# Patient Record
Sex: FEMALE | Race: BLACK OR AFRICAN AMERICAN | NOT HISPANIC OR LATINO | URBAN - METROPOLITAN AREA
[De-identification: names, ages, dates, MRNs, and addresses within clinical notes are randomized per-mention and may not be internally consistent; named-entity substitution may affect disease eponyms.]

---

## 2017-11-30 ENCOUNTER — EMERGENCY (EMERGENCY)
Facility: HOSPITAL | Age: 47
LOS: 1 days | Discharge: HOME | End: 2017-11-30
Attending: EMERGENCY MEDICINE
Payer: COMMERCIAL

## 2017-11-30 VITALS
OXYGEN SATURATION: 100 % | TEMPERATURE: 98 F | HEART RATE: 100 BPM | WEIGHT: 128.09 LBS | RESPIRATION RATE: 16 BRPM | SYSTOLIC BLOOD PRESSURE: 206 MMHG | DIASTOLIC BLOOD PRESSURE: 106 MMHG | HEIGHT: 63 IN

## 2017-11-30 VITALS — DIASTOLIC BLOOD PRESSURE: 89 MMHG | RESPIRATION RATE: 16 BRPM | SYSTOLIC BLOOD PRESSURE: 161 MMHG | HEART RATE: 74 BPM

## 2017-11-30 DIAGNOSIS — Z90.710 ACQUIRED ABSENCE OF BOTH CERVIX AND UTERUS: Chronic | ICD-10-CM

## 2017-11-30 DIAGNOSIS — Z98.51 TUBAL LIGATION STATUS: Chronic | ICD-10-CM

## 2017-11-30 DIAGNOSIS — N89.8 OTHER SPECIFIED NONINFLAMMATORY DISORDERS OF VAGINA: Chronic | ICD-10-CM

## 2017-11-30 DIAGNOSIS — Z98.890 OTHER SPECIFIED POSTPROCEDURAL STATES: Chronic | ICD-10-CM

## 2017-11-30 LAB
ALBUMIN SERPL ELPH-MCNC: 4.5 G/DL — SIGNIFICANT CHANGE UP (ref 3.3–5.2)
ALP SERPL-CCNC: 70 U/L — SIGNIFICANT CHANGE UP (ref 40–120)
ALT FLD-CCNC: 13 U/L — SIGNIFICANT CHANGE UP
ANION GAP SERPL CALC-SCNC: 12 MMOL/L — SIGNIFICANT CHANGE UP (ref 5–17)
APTT BLD: 26 SEC — LOW (ref 27.5–37.4)
AST SERPL-CCNC: 27 U/L — SIGNIFICANT CHANGE UP
BASOPHILS # BLD AUTO: 0 K/UL — SIGNIFICANT CHANGE UP (ref 0–0.2)
BASOPHILS NFR BLD AUTO: 0.2 % — SIGNIFICANT CHANGE UP (ref 0–2)
BILIRUB SERPL-MCNC: 0.8 MG/DL — SIGNIFICANT CHANGE UP (ref 0.4–2)
BUN SERPL-MCNC: 11 MG/DL — SIGNIFICANT CHANGE UP (ref 8–20)
CALCIUM SERPL-MCNC: 9.6 MG/DL — SIGNIFICANT CHANGE UP (ref 8.6–10.2)
CHLORIDE SERPL-SCNC: 100 MMOL/L — SIGNIFICANT CHANGE UP (ref 98–107)
CO2 SERPL-SCNC: 29 MMOL/L — SIGNIFICANT CHANGE UP (ref 22–29)
CREAT SERPL-MCNC: 0.63 MG/DL — SIGNIFICANT CHANGE UP (ref 0.5–1.3)
EOSINOPHIL # BLD AUTO: 0 K/UL — SIGNIFICANT CHANGE UP (ref 0–0.5)
EOSINOPHIL NFR BLD AUTO: 0.2 % — SIGNIFICANT CHANGE UP (ref 0–6)
GLUCOSE SERPL-MCNC: 93 MG/DL — SIGNIFICANT CHANGE UP (ref 70–115)
HCT VFR BLD CALC: 39.5 % — SIGNIFICANT CHANGE UP (ref 37–47)
HGB BLD-MCNC: 13.2 G/DL — SIGNIFICANT CHANGE UP (ref 12–16)
INR BLD: 0.96 RATIO — SIGNIFICANT CHANGE UP (ref 0.88–1.16)
LYMPHOCYTES # BLD AUTO: 1.4 K/UL — SIGNIFICANT CHANGE UP (ref 1–4.8)
LYMPHOCYTES # BLD AUTO: 26 % — SIGNIFICANT CHANGE UP (ref 20–55)
MCHC RBC-ENTMCNC: 29.9 PG — SIGNIFICANT CHANGE UP (ref 27–31)
MCHC RBC-ENTMCNC: 33.4 G/DL — SIGNIFICANT CHANGE UP (ref 32–36)
MCV RBC AUTO: 89.4 FL — SIGNIFICANT CHANGE UP (ref 81–99)
MONOCYTES # BLD AUTO: 0.6 K/UL — SIGNIFICANT CHANGE UP (ref 0–0.8)
MONOCYTES NFR BLD AUTO: 12.2 % — HIGH (ref 3–10)
NEUTROPHILS # BLD AUTO: 3.2 K/UL — SIGNIFICANT CHANGE UP (ref 1.8–8)
NEUTROPHILS NFR BLD AUTO: 61.2 % — SIGNIFICANT CHANGE UP (ref 37–73)
NT-PROBNP SERPL-SCNC: 10 PG/ML — SIGNIFICANT CHANGE UP (ref 0–300)
PLATELET # BLD AUTO: 180 K/UL — SIGNIFICANT CHANGE UP (ref 150–400)
POTASSIUM SERPL-MCNC: 3.5 MMOL/L — SIGNIFICANT CHANGE UP (ref 3.5–5.3)
POTASSIUM SERPL-SCNC: 3.5 MMOL/L — SIGNIFICANT CHANGE UP (ref 3.5–5.3)
PROT SERPL-MCNC: 7.7 G/DL — SIGNIFICANT CHANGE UP (ref 6.6–8.7)
PROTHROM AB SERPL-ACNC: 10.5 SEC — SIGNIFICANT CHANGE UP (ref 9.8–12.7)
RBC # BLD: 4.42 M/UL — SIGNIFICANT CHANGE UP (ref 4.4–5.2)
RBC # FLD: 14.4 % — SIGNIFICANT CHANGE UP (ref 11–15.6)
SODIUM SERPL-SCNC: 141 MMOL/L — SIGNIFICANT CHANGE UP (ref 135–145)
TROPONIN T SERPL-MCNC: <0.01 NG/ML — SIGNIFICANT CHANGE UP (ref 0–0.06)
TROPONIN T SERPL-MCNC: <0.01 NG/ML — SIGNIFICANT CHANGE UP (ref 0–0.06)
WBC # BLD: 5.2 K/UL — SIGNIFICANT CHANGE UP (ref 4.8–10.8)
WBC # FLD AUTO: 5.2 K/UL — SIGNIFICANT CHANGE UP (ref 4.8–10.8)

## 2017-11-30 PROCEDURE — 36415 COLL VENOUS BLD VENIPUNCTURE: CPT

## 2017-11-30 PROCEDURE — 85730 THROMBOPLASTIN TIME PARTIAL: CPT

## 2017-11-30 PROCEDURE — 85610 PROTHROMBIN TIME: CPT

## 2017-11-30 PROCEDURE — 83880 ASSAY OF NATRIURETIC PEPTIDE: CPT

## 2017-11-30 PROCEDURE — 85027 COMPLETE CBC AUTOMATED: CPT

## 2017-11-30 PROCEDURE — 93010 ELECTROCARDIOGRAM REPORT: CPT

## 2017-11-30 PROCEDURE — 80053 COMPREHEN METABOLIC PANEL: CPT

## 2017-11-30 PROCEDURE — 99285 EMERGENCY DEPT VISIT HI MDM: CPT

## 2017-11-30 PROCEDURE — 96374 THER/PROPH/DIAG INJ IV PUSH: CPT

## 2017-11-30 PROCEDURE — 84484 ASSAY OF TROPONIN QUANT: CPT

## 2017-11-30 PROCEDURE — 71045 X-RAY EXAM CHEST 1 VIEW: CPT

## 2017-11-30 PROCEDURE — 93005 ELECTROCARDIOGRAM TRACING: CPT

## 2017-11-30 PROCEDURE — 71010: CPT | Mod: 26

## 2017-11-30 PROCEDURE — 99284 EMERGENCY DEPT VISIT MOD MDM: CPT | Mod: 25

## 2017-11-30 RX ORDER — METOPROLOL TARTRATE 50 MG
1 TABLET ORAL
Qty: 12 | Refills: 0
Start: 2017-11-30 | End: 2017-12-12

## 2017-11-30 RX ORDER — METOPROLOL TARTRATE 50 MG
25 TABLET ORAL DAILY
Qty: 0 | Refills: 0 | Status: DISCONTINUED | OUTPATIENT
Start: 2017-11-30 | End: 2017-12-04

## 2017-11-30 RX ORDER — FAMOTIDINE 10 MG/ML
20 INJECTION INTRAVENOUS ONCE
Qty: 0 | Refills: 0 | Status: COMPLETED | OUTPATIENT
Start: 2017-11-30 | End: 2017-11-30

## 2017-11-30 RX ORDER — LABETALOL HCL 100 MG
10 TABLET ORAL ONCE
Qty: 0 | Refills: 0 | Status: DISCONTINUED | OUTPATIENT
Start: 2017-11-30 | End: 2017-11-30

## 2017-11-30 RX ORDER — SODIUM CHLORIDE 9 MG/ML
3 INJECTION INTRAMUSCULAR; INTRAVENOUS; SUBCUTANEOUS ONCE
Qty: 0 | Refills: 0 | Status: COMPLETED | OUTPATIENT
Start: 2017-11-30 | End: 2017-11-30

## 2017-11-30 RX ADMIN — FAMOTIDINE 20 MILLIGRAM(S): 10 INJECTION INTRAVENOUS at 10:51

## 2017-11-30 RX ADMIN — SODIUM CHLORIDE 3 MILLILITER(S): 9 INJECTION INTRAMUSCULAR; INTRAVENOUS; SUBCUTANEOUS at 10:00

## 2017-11-30 RX ADMIN — Medication 30 MILLILITER(S): at 10:51

## 2017-11-30 RX ADMIN — Medication 25 MILLIGRAM(S): at 10:47

## 2017-11-30 NOTE — ED PROVIDER NOTE - PSH
S/P foot surgery  had surgery x2 to rt foot for abscess  S/P hysterectomy    S/P tubal ligation    Vaginal cyst

## 2017-11-30 NOTE — ED ADULT TRIAGE NOTE - CHIEF COMPLAINT QUOTE
has been off blood pressure medication since April and arrives today with c/o hypertension.  checked her pressure at work and it was SBP ranging from 150-170. Denies  HA, nausea, vomiting. Reports feeling. Reports chest pressure x 1 week.
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
## 2017-11-30 NOTE — ED PROVIDER NOTE - OBJECTIVE STATEMENT
46 y/o F presents with elev BP say she stopped taking her BP meds about 6 months ago - doesn't remember name of medicine, says she was at work and took her BP and found to be high so decided to come in denies specific HA some vague feeling of fullness in her chest for one week nonexertional siblings with MIs and father at young age no recent provacative testing

## 2017-11-30 NOTE — ED ADULT NURSE REASSESSMENT NOTE - NS ED NURSE REASSESS COMMENT FT1
patient found in ED, discharge instructions reviewed with patient, verbalized understanding noted, ambulatory, family member with patient

## 2017-11-30 NOTE — ED ADULT NURSE NOTE - OBJECTIVE STATEMENT
has had chest pressure for approx 1 week,has not taken bp meds since april,states she is burping and has a "gas pocket" in mid chest

## 2017-11-30 NOTE — SBIRT NOTE. - NSSBIRTSERVICES_GEN_A_ED_FT
Provided SBIRT services: Full screen positive. Brief Intervention Performed. Screening results were reviewed with the patient and patient was provided information about healthy guidelines and potential negative consequences associated with level of risk. Motivation and readiness to reduce or stop use was discussed and goals and activities to make changes were suggested/offered.  Audit Score: 19  DAST Score: 0  Duration = 20 Minutes

## 2017-12-01 NOTE — ED POST DISCHARGE NOTE - REASON FOR FOLLOW-UP
April 13, 2017      Jaret Navarro MD  [address] [address]           [facility] General Surgery  [address]  [place] [address]  Phone: [phone]  Fax: [phone]          Patient: Leia Rodriguez   MR Number: [id number]   YOB: 1951   Date of Visit: 4/12/2017       Dear Jaret Navarro:    Thank you for referring Leia Rodriguez to me for evaluation. Attached you will find relevant portions of my assessment and plan of care.    If you have questions, please do not hesitate to call me. I look forward to following Leia Rodriguez along with you.    Sincerely,    Jony Duke MD    Enclosure  CC:  No Recipients    If you would like to receive this communication electronically, please contact [email or web address] or [phone] to request more information on [facility] access.    For providers and/or their staff who would like to refer a patient to [facility], please contact us through our one-stop-shop provider referral line, [facility] , at [phone].    If you feel you have received this communication in error or would no longer like to receive these types of communications, please e-mail [email or web address]          Other follow-up call

## 2017-12-31 ENCOUNTER — EMERGENCY (EMERGENCY)
Facility: HOSPITAL | Age: 47
LOS: 1 days | Discharge: HOME | End: 2017-12-31
Attending: EMERGENCY MEDICINE | Admitting: EMERGENCY MEDICINE
Payer: COMMERCIAL

## 2017-12-31 VITALS
HEIGHT: 67 IN | SYSTOLIC BLOOD PRESSURE: 129 MMHG | TEMPERATURE: 98 F | DIASTOLIC BLOOD PRESSURE: 83 MMHG | HEART RATE: 99 BPM | WEIGHT: 119.93 LBS | OXYGEN SATURATION: 99 % | RESPIRATION RATE: 20 BRPM

## 2017-12-31 VITALS
DIASTOLIC BLOOD PRESSURE: 88 MMHG | HEART RATE: 73 BPM | SYSTOLIC BLOOD PRESSURE: 153 MMHG | RESPIRATION RATE: 18 BRPM | OXYGEN SATURATION: 100 % | TEMPERATURE: 98 F

## 2017-12-31 DIAGNOSIS — Z98.890 OTHER SPECIFIED POSTPROCEDURAL STATES: Chronic | ICD-10-CM

## 2017-12-31 DIAGNOSIS — Z98.51 TUBAL LIGATION STATUS: Chronic | ICD-10-CM

## 2017-12-31 DIAGNOSIS — R69 ILLNESS, UNSPECIFIED: ICD-10-CM

## 2017-12-31 DIAGNOSIS — F10.10 ALCOHOL ABUSE, UNCOMPLICATED: ICD-10-CM

## 2017-12-31 DIAGNOSIS — Z90.710 ACQUIRED ABSENCE OF BOTH CERVIX AND UTERUS: Chronic | ICD-10-CM

## 2017-12-31 DIAGNOSIS — N89.8 OTHER SPECIFIED NONINFLAMMATORY DISORDERS OF VAGINA: Chronic | ICD-10-CM

## 2017-12-31 DIAGNOSIS — F43.22 ADJUSTMENT DISORDER WITH ANXIETY: ICD-10-CM

## 2017-12-31 LAB
ALBUMIN SERPL ELPH-MCNC: 4.3 G/DL — SIGNIFICANT CHANGE UP (ref 3.3–5.2)
ALP SERPL-CCNC: 88 U/L — SIGNIFICANT CHANGE UP (ref 40–120)
ALT FLD-CCNC: 12 U/L — SIGNIFICANT CHANGE UP
AMPHET UR-MCNC: NEGATIVE — SIGNIFICANT CHANGE UP
ANION GAP SERPL CALC-SCNC: 13 MMOL/L — SIGNIFICANT CHANGE UP (ref 5–17)
APPEARANCE UR: CLEAR — SIGNIFICANT CHANGE UP
AST SERPL-CCNC: 24 U/L — SIGNIFICANT CHANGE UP
BACTERIA # UR AUTO: ABNORMAL
BARBITURATES UR SCN-MCNC: NEGATIVE — SIGNIFICANT CHANGE UP
BASOPHILS # BLD AUTO: 0 K/UL — SIGNIFICANT CHANGE UP (ref 0–0.2)
BASOPHILS NFR BLD AUTO: 0.3 % — SIGNIFICANT CHANGE UP (ref 0–2)
BENZODIAZ UR-MCNC: NEGATIVE — SIGNIFICANT CHANGE UP
BILIRUB SERPL-MCNC: 0.6 MG/DL — SIGNIFICANT CHANGE UP (ref 0.4–2)
BILIRUB UR-MCNC: NEGATIVE — SIGNIFICANT CHANGE UP
BUN SERPL-MCNC: 17 MG/DL — SIGNIFICANT CHANGE UP (ref 8–20)
CALCIUM SERPL-MCNC: 8.7 MG/DL — SIGNIFICANT CHANGE UP (ref 8.6–10.2)
CHLORIDE SERPL-SCNC: 102 MMOL/L — SIGNIFICANT CHANGE UP (ref 98–107)
CO2 SERPL-SCNC: 26 MMOL/L — SIGNIFICANT CHANGE UP (ref 22–29)
COCAINE METAB.OTHER UR-MCNC: NEGATIVE — SIGNIFICANT CHANGE UP
COLOR SPEC: YELLOW — SIGNIFICANT CHANGE UP
CREAT SERPL-MCNC: 0.53 MG/DL — SIGNIFICANT CHANGE UP (ref 0.5–1.3)
DIFF PNL FLD: ABNORMAL
EOSINOPHIL # BLD AUTO: 0 K/UL — SIGNIFICANT CHANGE UP (ref 0–0.5)
EOSINOPHIL NFR BLD AUTO: 0.5 % — SIGNIFICANT CHANGE UP (ref 0–6)
EPI CELLS # UR: SIGNIFICANT CHANGE UP
ETHANOL SERPL-MCNC: 177 MG/DL — SIGNIFICANT CHANGE UP
ETHANOL SERPL-MCNC: 284 MG/DL — SIGNIFICANT CHANGE UP
GLUCOSE SERPL-MCNC: 90 MG/DL — SIGNIFICANT CHANGE UP (ref 70–115)
GLUCOSE UR QL: NEGATIVE MG/DL — SIGNIFICANT CHANGE UP
HCG UR QL: NEGATIVE — SIGNIFICANT CHANGE UP
HCT VFR BLD CALC: 42.5 % — SIGNIFICANT CHANGE UP (ref 37–47)
HGB BLD-MCNC: 13.5 G/DL — SIGNIFICANT CHANGE UP (ref 12–16)
KETONES UR-MCNC: NEGATIVE — SIGNIFICANT CHANGE UP
LEUKOCYTE ESTERASE UR-ACNC: ABNORMAL
LYMPHOCYTES # BLD AUTO: 1.3 K/UL — SIGNIFICANT CHANGE UP (ref 1–4.8)
LYMPHOCYTES # BLD AUTO: 20.4 % — SIGNIFICANT CHANGE UP (ref 20–55)
MCHC RBC-ENTMCNC: 29 PG — SIGNIFICANT CHANGE UP (ref 27–31)
MCHC RBC-ENTMCNC: 31.8 G/DL — LOW (ref 32–36)
MCV RBC AUTO: 91.2 FL — SIGNIFICANT CHANGE UP (ref 81–99)
METHADONE UR-MCNC: NEGATIVE — SIGNIFICANT CHANGE UP
MONOCYTES # BLD AUTO: 0.5 K/UL — SIGNIFICANT CHANGE UP (ref 0–0.8)
MONOCYTES NFR BLD AUTO: 7.6 % — SIGNIFICANT CHANGE UP (ref 3–10)
NEUTROPHILS # BLD AUTO: 4.6 K/UL — SIGNIFICANT CHANGE UP (ref 1.8–8)
NEUTROPHILS NFR BLD AUTO: 71.2 % — SIGNIFICANT CHANGE UP (ref 37–73)
NITRITE UR-MCNC: POSITIVE
OPIATES UR-MCNC: NEGATIVE — SIGNIFICANT CHANGE UP
PCP SPEC-MCNC: SIGNIFICANT CHANGE UP
PCP UR-MCNC: NEGATIVE — SIGNIFICANT CHANGE UP
PH UR: 5 — SIGNIFICANT CHANGE UP (ref 5–8)
PLATELET # BLD AUTO: 205 K/UL — SIGNIFICANT CHANGE UP (ref 150–400)
POTASSIUM SERPL-MCNC: 3.7 MMOL/L — SIGNIFICANT CHANGE UP (ref 3.5–5.3)
POTASSIUM SERPL-SCNC: 3.7 MMOL/L — SIGNIFICANT CHANGE UP (ref 3.5–5.3)
PROT SERPL-MCNC: 7.8 G/DL — SIGNIFICANT CHANGE UP (ref 6.6–8.7)
PROT UR-MCNC: 30 MG/DL
RBC # BLD: 4.66 M/UL — SIGNIFICANT CHANGE UP (ref 4.4–5.2)
RBC # FLD: 14.9 % — SIGNIFICANT CHANGE UP (ref 11–15.6)
RBC CASTS # UR COMP ASSIST: ABNORMAL /HPF (ref 0–4)
SODIUM SERPL-SCNC: 141 MMOL/L — SIGNIFICANT CHANGE UP (ref 135–145)
SP GR SPEC: 1.02 — SIGNIFICANT CHANGE UP (ref 1.01–1.02)
THC UR QL: NEGATIVE — SIGNIFICANT CHANGE UP
TSH SERPL-MCNC: 0.15 UIU/ML — LOW (ref 0.27–4.2)
UROBILINOGEN FLD QL: NEGATIVE MG/DL — SIGNIFICANT CHANGE UP
WBC # BLD: 6.4 K/UL — SIGNIFICANT CHANGE UP (ref 4.8–10.8)
WBC # FLD AUTO: 6.4 K/UL — SIGNIFICANT CHANGE UP (ref 4.8–10.8)
WBC UR QL: SIGNIFICANT CHANGE UP

## 2017-12-31 PROCEDURE — 82962 GLUCOSE BLOOD TEST: CPT

## 2017-12-31 PROCEDURE — 87186 SC STD MICRODIL/AGAR DIL: CPT

## 2017-12-31 PROCEDURE — 84443 ASSAY THYROID STIM HORMONE: CPT

## 2017-12-31 PROCEDURE — 99284 EMERGENCY DEPT VISIT MOD MDM: CPT | Mod: 25

## 2017-12-31 PROCEDURE — 90791 PSYCH DIAGNOSTIC EVALUATION: CPT

## 2017-12-31 PROCEDURE — 85027 COMPLETE CBC AUTOMATED: CPT

## 2017-12-31 PROCEDURE — 80053 COMPREHEN METABOLIC PANEL: CPT

## 2017-12-31 PROCEDURE — 80307 DRUG TEST PRSMV CHEM ANLYZR: CPT

## 2017-12-31 PROCEDURE — 81001 URINALYSIS AUTO W/SCOPE: CPT

## 2017-12-31 PROCEDURE — 96374 THER/PROPH/DIAG INJ IV PUSH: CPT

## 2017-12-31 PROCEDURE — 87086 URINE CULTURE/COLONY COUNT: CPT

## 2017-12-31 PROCEDURE — 81025 URINE PREGNANCY TEST: CPT

## 2017-12-31 PROCEDURE — 36415 COLL VENOUS BLD VENIPUNCTURE: CPT

## 2017-12-31 RX ORDER — SODIUM CHLORIDE 9 MG/ML
999 INJECTION INTRAMUSCULAR; INTRAVENOUS; SUBCUTANEOUS ONCE
Qty: 0 | Refills: 0 | Status: COMPLETED | OUTPATIENT
Start: 2017-12-31 | End: 2017-12-31

## 2017-12-31 RX ORDER — CEFTRIAXONE 500 MG/1
1 INJECTION, POWDER, FOR SOLUTION INTRAMUSCULAR; INTRAVENOUS ONCE
Qty: 0 | Refills: 0 | Status: COMPLETED | OUTPATIENT
Start: 2017-12-31 | End: 2017-12-31

## 2017-12-31 RX ADMIN — SODIUM CHLORIDE 999 MILLILITER(S): 9 INJECTION INTRAMUSCULAR; INTRAVENOUS; SUBCUTANEOUS at 03:25

## 2017-12-31 RX ADMIN — CEFTRIAXONE 100 GRAM(S): 500 INJECTION, POWDER, FOR SOLUTION INTRAMUSCULAR; INTRAVENOUS at 03:25

## 2017-12-31 NOTE — ED BEHAVIORAL HEALTH ASSESSMENT NOTE - RISK ASSESSMENT
Low risk given no current suicidality or homicidality. Low risk given no current suicidality or homicidality. She is future-oriented; works fulltime and identifies her family as protective factors.
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
## 2017-12-31 NOTE — ED PROVIDER NOTE - NS ED ROS FT
+ETOH use, +depression   no weight change, no fever or chills  no rash, no bruises  no visual changes no eye discharge  no cough cold or congestion,   no sob, no chest pain  no orthopnea, no pnd  no abd pain, no n/v/d  no hematuria, no change in urinary habits  no joint pain, no deformity  no headache, no paresthesia

## 2017-12-31 NOTE — ED BEHAVIORAL HEALTH ASSESSMENT NOTE - SUICIDE PROTECTIVE FACTORS
Identifies reasons for living/Future oriented/Supportive social network or family/Responsibility to family and others

## 2017-12-31 NOTE — ED ADULT TRIAGE NOTE - CHIEF COMPLAINT QUOTE
pt BIBA with complaints of anxiety pt states she has extra stress lately, pt states she drink a lot today.  pt states she is looking for help.
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
## 2017-12-31 NOTE — ED BEHAVIORAL HEALTH ASSESSMENT NOTE - SUMMARY
47-year-old, , -American female, non-caregiver, mother of 5 adult children, employed, domiciles with  in [place]; without any history of psychiatric hospitalizations or treatment except in 1996 when she reportedly spent  one week at [facility] after breaking up with her ex-. With a medical history of HTN and insomnia; with a history of alcohol abuse; denies any history of drug abuse; without current legal involvement; brought in to the hospital via EMS last night after patient called 911 stating she was feeling overwhelmed.    On assessment, patient is calm and cooperative with “ok” mood; states “I was feeling overwhelmed last night after having an argument with  who is schizophrenic”. Patient went further to say that she works fulltime at [employer] from 10 PM until 7 AM and that she has to come home to clean and cook, and that her  who is not working does not help. States she feels neglected by her , and that he tends to spend the night out hanging out with his friends, and not picking up his cell phone when she calls him. Patient further states “drinking is the only way I know how to cope”, however, she adamantly denies current suicidal thoughts or current plans to end her life citing her children as protective factors. Patient reports that her 22 year old daughter who only works part time also relies on her for financial help. She feels obligated to help her as her mother.  She states that her  had an argument with her last night because he is angry about her daughter “coming into the house using her own key whenever she wants to despite not living in the house. Patient endorses her appetite as fair while her sleep is poor; however, she denies current suicidality, homicidality; denies current psychotic symptoms including hallucinations, delusions, paranoia, ideas of reference, and none noted. Patient endorses some anxiety about relapsing. However, she is declining inpatient alcohol rehab suggested to her at this time. She states she is the only provider for her household; paying all the bills, and that she cannot afford not to work at this time; however, she states she will go to AA meetings and agreed to follow-up with [facility] for outpatient psychiatric services. She has an appointment scheduled on 1/8/18 at 11 AM with the family  At this time, she does not meet criteria for inpatient psychiatry. 47-year-old, , -American female, non-caregiver, mother of 5 adult children, employed, domiciles with  in [place]; without any history of psychiatric hospitalizations or treatment except in 1996 when she reportedly spent  one week at [facility] after breaking up with her ex-. With a medical history of HTN and insomnia; with a history of alcohol abuse; denies any history of drug abuse; without current legal involvement; brought in to the hospital via EMS last night after patient called 911 stating she was feeling overwhelmed.    Patient denies current suicidal or homicidal ideations; denies current intent or plans to die; denies current psychosis, and none noted. At this time, she does not meet criteria for inpatient psychiatry. However, she is receptive to following up with outpatient psychiatry as [facility] with [facility] on 1/8/18 at 11 AM. Patient has no safety concerns about returning home with her .

## 2017-12-31 NOTE — ED PROVIDER NOTE - PHYSICAL EXAMINATION
Constitutional : Appears comfortably, talking in full sentences  Head :NC AT , no swelling  Eyes :eomi, no swelling  Mouth :mm moist, alcohol on breath, poor dentition  Neck : supple, trachea in midline  Chest :Anand air entry, symm chest expansion, no distress  Heart :S1 S2 distant  Abdomen :abd soft, non tender  Musc/Skel :ext no swelling, no deformity, no spine tenderness, distal pulses present  Neuro  :AAO 3 no focal deficits

## 2017-12-31 NOTE — ED BEHAVIORAL HEALTH ASSESSMENT NOTE - PATIENT'S CHIEF COMPLAINT
“I came to the hospital because I had very bad anxiety; I called 911 because I felt I was losing my mind; my drinking did not help”.

## 2017-12-31 NOTE — ED ADULT NURSE REASSESSMENT NOTE - NS ED NURSE REASSESS COMMENT FT1
pt placed in yellow gown. belonging placed in secure location
PIV removed, report given to , pt status unchanged, refer to flowsheet and chart, pt safety maintained, pt hemodynamically stable, pt transferred to 
pt status unchanged, refer to flowsheet and chart, pt safety maintained, pt hemodynamically stable, pending tele-psych consult
Patient received awake from main ED.  Patient alert and oriented times three.  Speech is clear and relevant.   Patient endorses she drunk "a lot" yesterday and has been feeling overwhelmed with responsibilities at home but denies suicidal or homicidal ideation.  Patient reports pattern of drinking daily after working fulltime hours at [employer].  Patient denies drinking is a problem or having withdrawal symptoms when not drinking but describes ETOH as her "friend" and reports she would like to stop.  Patient reports her  has schizophrenia and often leaves the house for periods of time and then just returns which she allows because it was his family home.  Her relationship with he  has lead to conflict with her gown children.  Patient identifies her self as main financial provider for home and caretaker of home.  Patient is not receiving any current psychiatric treatment but was once hospitalized in the 1990 after a "nervous breakdown" which she was dx with depression and started on medication but stopped shortly after starting.  Patient feels she could benefit from outpatient treatment and not in need of inpatient hospitalization.  Cooperative with security contraband assessment and securing belongings in  area.  CIWA is 0.

## 2017-12-31 NOTE — ED BEHAVIORAL HEALTH ASSESSMENT NOTE - REFERRAL / APPOINTMENT DETAILS
Appointment given for outpatient psychiatric follow-up [facility] [place] Appointment given for [facility] [place] on 1/8/18 at 11 AM.

## 2017-12-31 NOTE — ED ADULT NURSE NOTE - CAS DISCH BELONGINGS RETURNED
clothing 2 phones and a small tablet given to patient. patient assisted out to the waiting room to her spouse./Yes

## 2017-12-31 NOTE — ED BEHAVIORAL HEALTH NOTE - BEHAVIORAL HEALTH NOTE
SW note: Pt treat and release as per NP Angelina Prince.  Pt. agreed to FLS appointment.  Appointment made for Monday 1/8/18 at 11am.  Pt. reported she will contact a family member to provide transportation back to her home.  Pt. stated she does not need any other assistance from SW at this time.

## 2018-01-03 RX ORDER — CEPHALEXIN 500 MG
1 CAPSULE ORAL
Qty: 40 | Refills: 0
Start: 2018-01-03 | End: 2018-01-12

## 2018-04-05 ENCOUNTER — EMERGENCY (EMERGENCY)
Facility: HOSPITAL | Age: 48
LOS: 1 days | Discharge: HOME | End: 2018-04-05
Attending: EMERGENCY MEDICINE | Admitting: EMERGENCY MEDICINE
Payer: COMMERCIAL

## 2018-04-05 VITALS
RESPIRATION RATE: 20 BRPM | WEIGHT: 119.93 LBS | DIASTOLIC BLOOD PRESSURE: 118 MMHG | SYSTOLIC BLOOD PRESSURE: 166 MMHG | HEIGHT: 67 IN | OXYGEN SATURATION: 96 % | TEMPERATURE: 98 F | HEART RATE: 111 BPM

## 2018-04-05 VITALS
TEMPERATURE: 98 F | OXYGEN SATURATION: 98 % | DIASTOLIC BLOOD PRESSURE: 90 MMHG | SYSTOLIC BLOOD PRESSURE: 141 MMHG | HEART RATE: 90 BPM | RESPIRATION RATE: 20 BRPM

## 2018-04-05 DIAGNOSIS — Z98.890 OTHER SPECIFIED POSTPROCEDURAL STATES: Chronic | ICD-10-CM

## 2018-04-05 DIAGNOSIS — N89.8 OTHER SPECIFIED NONINFLAMMATORY DISORDERS OF VAGINA: Chronic | ICD-10-CM

## 2018-04-05 DIAGNOSIS — Z90.710 ACQUIRED ABSENCE OF BOTH CERVIX AND UTERUS: Chronic | ICD-10-CM

## 2018-04-05 DIAGNOSIS — Z98.51 TUBAL LIGATION STATUS: Chronic | ICD-10-CM

## 2018-04-05 LAB
ALBUMIN SERPL ELPH-MCNC: 3.9 G/DL — SIGNIFICANT CHANGE UP (ref 3.3–5.2)
ALP SERPL-CCNC: 74 U/L — SIGNIFICANT CHANGE UP (ref 40–120)
ALT FLD-CCNC: 12 U/L — SIGNIFICANT CHANGE UP
ANION GAP SERPL CALC-SCNC: 12 MMOL/L — SIGNIFICANT CHANGE UP (ref 5–17)
ANISOCYTOSIS BLD QL: SLIGHT — SIGNIFICANT CHANGE UP
APPEARANCE UR: ABNORMAL
AST SERPL-CCNC: 24 U/L — SIGNIFICANT CHANGE UP
BACTERIA # UR AUTO: ABNORMAL
BASOPHILS # BLD AUTO: 0 K/UL — SIGNIFICANT CHANGE UP (ref 0–0.2)
BASOPHILS NFR BLD AUTO: 0.3 % — SIGNIFICANT CHANGE UP (ref 0–2)
BILIRUB SERPL-MCNC: 1.7 MG/DL — SIGNIFICANT CHANGE UP (ref 0.4–2)
BILIRUB UR-MCNC: NEGATIVE — SIGNIFICANT CHANGE UP
BUN SERPL-MCNC: 10 MG/DL — SIGNIFICANT CHANGE UP (ref 8–20)
CALCIUM SERPL-MCNC: 9.2 MG/DL — SIGNIFICANT CHANGE UP (ref 8.6–10.2)
CHLORIDE SERPL-SCNC: 99 MMOL/L — SIGNIFICANT CHANGE UP (ref 98–107)
CO2 SERPL-SCNC: 26 MMOL/L — SIGNIFICANT CHANGE UP (ref 22–29)
COLOR SPEC: YELLOW — SIGNIFICANT CHANGE UP
CREAT SERPL-MCNC: 0.67 MG/DL — SIGNIFICANT CHANGE UP (ref 0.5–1.3)
DIFF PNL FLD: ABNORMAL
EOSINOPHIL # BLD AUTO: 0 K/UL — SIGNIFICANT CHANGE UP (ref 0–0.5)
EOSINOPHIL NFR BLD AUTO: 0.1 % — SIGNIFICANT CHANGE UP (ref 0–6)
EPI CELLS # UR: SIGNIFICANT CHANGE UP
GLUCOSE SERPL-MCNC: 101 MG/DL — SIGNIFICANT CHANGE UP (ref 70–115)
GLUCOSE UR QL: NEGATIVE MG/DL — SIGNIFICANT CHANGE UP
HCT VFR BLD CALC: 39 % — SIGNIFICANT CHANGE UP (ref 37–47)
HGB BLD-MCNC: 12.3 G/DL — SIGNIFICANT CHANGE UP (ref 12–16)
HYPOCHROMIA BLD QL: SLIGHT — SIGNIFICANT CHANGE UP
KETONES UR-MCNC: NEGATIVE — SIGNIFICANT CHANGE UP
LEUKOCYTE ESTERASE UR-ACNC: ABNORMAL
LIDOCAIN IGE QN: 22 U/L — SIGNIFICANT CHANGE UP (ref 22–51)
LYMPHOCYTES # BLD AUTO: 1.2 K/UL — SIGNIFICANT CHANGE UP (ref 1–4.8)
LYMPHOCYTES # BLD AUTO: 16.3 % — LOW (ref 20–55)
MACROCYTES BLD QL: SLIGHT — SIGNIFICANT CHANGE UP
MCHC RBC-ENTMCNC: 29.8 PG — SIGNIFICANT CHANGE UP (ref 27–31)
MCHC RBC-ENTMCNC: 31.5 G/DL — LOW (ref 32–36)
MCV RBC AUTO: 94.4 FL — SIGNIFICANT CHANGE UP (ref 81–99)
MONOCYTES # BLD AUTO: 1 K/UL — HIGH (ref 0–0.8)
MONOCYTES NFR BLD AUTO: 13.8 % — HIGH (ref 3–10)
NEUTROPHILS # BLD AUTO: 5.3 K/UL — SIGNIFICANT CHANGE UP (ref 1.8–8)
NEUTROPHILS NFR BLD AUTO: 69.2 % — SIGNIFICANT CHANGE UP (ref 37–73)
NITRITE UR-MCNC: NEGATIVE — SIGNIFICANT CHANGE UP
OVALOCYTES BLD QL SMEAR: SLIGHT — SIGNIFICANT CHANGE UP
PH UR: 7 — SIGNIFICANT CHANGE UP (ref 5–8)
PLAT MORPH BLD: NORMAL — SIGNIFICANT CHANGE UP
PLATELET # BLD AUTO: 167 K/UL — SIGNIFICANT CHANGE UP (ref 150–400)
POIKILOCYTOSIS BLD QL AUTO: SLIGHT — SIGNIFICANT CHANGE UP
POTASSIUM SERPL-MCNC: 3.6 MMOL/L — SIGNIFICANT CHANGE UP (ref 3.5–5.3)
POTASSIUM SERPL-SCNC: 3.6 MMOL/L — SIGNIFICANT CHANGE UP (ref 3.5–5.3)
PROT SERPL-MCNC: 7 G/DL — SIGNIFICANT CHANGE UP (ref 6.6–8.7)
PROT UR-MCNC: 30 MG/DL
RBC # BLD: 4.13 M/UL — LOW (ref 4.4–5.2)
RBC # FLD: 13.7 % — SIGNIFICANT CHANGE UP (ref 11–15.6)
RBC BLD AUTO: ABNORMAL
RBC CASTS # UR COMP ASSIST: ABNORMAL /HPF (ref 0–4)
SODIUM SERPL-SCNC: 137 MMOL/L — SIGNIFICANT CHANGE UP (ref 135–145)
SP GR SPEC: 1 — LOW (ref 1.01–1.02)
STOMATOCYTES BLD QL SMEAR: SLIGHT — SIGNIFICANT CHANGE UP
UROBILINOGEN FLD QL: NEGATIVE MG/DL — SIGNIFICANT CHANGE UP
WBC # BLD: 7.6 K/UL — SIGNIFICANT CHANGE UP (ref 4.8–10.8)
WBC # FLD AUTO: 7.6 K/UL — SIGNIFICANT CHANGE UP (ref 4.8–10.8)
WBC UR QL: >50

## 2018-04-05 PROCEDURE — 80053 COMPREHEN METABOLIC PANEL: CPT

## 2018-04-05 PROCEDURE — 83690 ASSAY OF LIPASE: CPT

## 2018-04-05 PROCEDURE — 87086 URINE CULTURE/COLONY COUNT: CPT

## 2018-04-05 PROCEDURE — 96374 THER/PROPH/DIAG INJ IV PUSH: CPT

## 2018-04-05 PROCEDURE — 36415 COLL VENOUS BLD VENIPUNCTURE: CPT

## 2018-04-05 PROCEDURE — 81001 URINALYSIS AUTO W/SCOPE: CPT

## 2018-04-05 PROCEDURE — 87186 SC STD MICRODIL/AGAR DIL: CPT

## 2018-04-05 PROCEDURE — 85027 COMPLETE CBC AUTOMATED: CPT

## 2018-04-05 PROCEDURE — 99284 EMERGENCY DEPT VISIT MOD MDM: CPT

## 2018-04-05 PROCEDURE — 99284 EMERGENCY DEPT VISIT MOD MDM: CPT | Mod: 25

## 2018-04-05 PROCEDURE — 74176 CT ABD & PELVIS W/O CONTRAST: CPT

## 2018-04-05 PROCEDURE — 74176 CT ABD & PELVIS W/O CONTRAST: CPT | Mod: 26

## 2018-04-05 RX ORDER — CEPHALEXIN 500 MG
1 CAPSULE ORAL
Qty: 40 | Refills: 0
Start: 2018-04-05 | End: 2018-04-14

## 2018-04-05 RX ORDER — KETOROLAC TROMETHAMINE 30 MG/ML
30 SYRINGE (ML) INJECTION ONCE
Qty: 0 | Refills: 0 | Status: DISCONTINUED | OUTPATIENT
Start: 2018-04-05 | End: 2018-04-05

## 2018-04-05 RX ORDER — CEPHALEXIN 500 MG
500 CAPSULE ORAL ONCE
Qty: 0 | Refills: 0 | Status: COMPLETED | OUTPATIENT
Start: 2018-04-05 | End: 2018-04-05

## 2018-04-05 RX ORDER — SODIUM CHLORIDE 9 MG/ML
1000 INJECTION INTRAMUSCULAR; INTRAVENOUS; SUBCUTANEOUS ONCE
Qty: 0 | Refills: 0 | Status: COMPLETED | OUTPATIENT
Start: 2018-04-05 | End: 2018-04-05

## 2018-04-05 RX ADMIN — Medication 500 MILLIGRAM(S): at 22:51

## 2018-04-05 RX ADMIN — Medication 30 MILLIGRAM(S): at 20:57

## 2018-04-05 RX ADMIN — SODIUM CHLORIDE 1000 MILLILITER(S): 9 INJECTION INTRAMUSCULAR; INTRAVENOUS; SUBCUTANEOUS at 20:57

## 2018-04-05 NOTE — ED PROVIDER NOTE - CHPI ED SYMPTOMS NEG
no dysuria/no fever/no hematuria/no chills/no diarrhea/no abdominal distension/no blood in stool/no vomiting

## 2018-04-05 NOTE — ED ADULT NURSE NOTE - OBJECTIVE STATEMENT
received pt AOx4 in [facility], c/o lower back pain radiating to RLQ pain started on Tuesday, hx of back pain, states heavy lifting at work, dysuria, fevers chills, sweats,  resp even unlabored, in no distress, MAEx4, neuro intact. will continue to monitor

## 2018-04-05 NOTE — ED PROVIDER NOTE - OBJECTIVE STATEMENT
48 yo F PT, PmHx: HTN, Pshx: hysterectomy, oophorectomy, presents to ED complaining of right flank pain x 2 days. PT states she is having sharp right flank pain radiating towards her groin, with associated burning on urination and nausea. PT states pain started and " It thought it was muscles but today it became 10/10 pain wrapping towards my groin. PT denies hematuria, vaginal discharge, fevers, chills, V/D, constipation, chest pain, SOB, trauma, and any other associated symptoms.

## 2018-04-05 NOTE — ED PROVIDER NOTE - PROGRESS NOTE DETAILS
PT hungry and requesting to eat. Pt states pain decreased. labs and imaging reviewed with PT. rx abx. PT verbalized understanding of diagnosis and importance of follow up at [facility]. PT educated on importance of follow up and when to return to the ED.

## 2018-04-05 NOTE — ED PROVIDER NOTE - ATTENDING CONTRIBUTION TO CARE
46 yo Female seen and evaluated with ACP  Presents to ED for right flank pain x 2 days  PSH significant for hysterectomy and oopherectomy  urinary symptoms present = dysuria  vitals reviewed, exam as documented, right CVAT  suspicious for UTI/Pyelo  CT to eval for renal stone/infected stone  treat symptomatically, check results, reassess

## 2018-04-09 NOTE — ED ADULT TRIAGE NOTE - PAIN: PRESENCE, MLM
Muscle Hinge Flap Text: The defect edges were debeveled with a #15 scalpel blade.  Given the size, depth and location of the defect and the proximity to free margins a muscle hinge flap was deemed most appropriate.  Using a sterile surgical marker, an appropriate hinge flap was drawn incorporating the defect. The area thus outlined was incised with a #15 scalpel blade.  The skin margins were undermined to an appropriate distance in all directions utilizing iris scissors. denies pain/discomfort

## 2018-07-08 ENCOUNTER — EMERGENCY (EMERGENCY)
Facility: HOSPITAL | Age: 48
LOS: 1 days | Discharge: HOME | End: 2018-07-08
Attending: EMERGENCY MEDICINE
Payer: COMMERCIAL

## 2018-07-08 VITALS
TEMPERATURE: 98 F | SYSTOLIC BLOOD PRESSURE: 104 MMHG | HEART RATE: 160 BPM | HEIGHT: 67 IN | RESPIRATION RATE: 16 BRPM | OXYGEN SATURATION: 99 % | DIASTOLIC BLOOD PRESSURE: 72 MMHG | WEIGHT: 119.93 LBS

## 2018-07-08 DIAGNOSIS — Z98.51 TUBAL LIGATION STATUS: Chronic | ICD-10-CM

## 2018-07-08 DIAGNOSIS — N89.8 OTHER SPECIFIED NONINFLAMMATORY DISORDERS OF VAGINA: Chronic | ICD-10-CM

## 2018-07-08 DIAGNOSIS — Z98.890 OTHER SPECIFIED POSTPROCEDURAL STATES: Chronic | ICD-10-CM

## 2018-07-08 DIAGNOSIS — Z90.710 ACQUIRED ABSENCE OF BOTH CERVIX AND UTERUS: Chronic | ICD-10-CM

## 2018-07-08 LAB
ALBUMIN SERPL ELPH-MCNC: 4.3 G/DL — SIGNIFICANT CHANGE UP (ref 3.3–5.2)
ALP SERPL-CCNC: 81 U/L — SIGNIFICANT CHANGE UP (ref 40–120)
ALT FLD-CCNC: 26 U/L — SIGNIFICANT CHANGE UP
AMPHET UR-MCNC: NEGATIVE — SIGNIFICANT CHANGE UP
ANION GAP SERPL CALC-SCNC: 23 MMOL/L — HIGH (ref 5–17)
APPEARANCE UR: CLEAR — SIGNIFICANT CHANGE UP
APTT BLD: 25.3 SEC — LOW (ref 27.5–37.4)
AST SERPL-CCNC: 48 U/L — HIGH
BARBITURATES UR SCN-MCNC: NEGATIVE — SIGNIFICANT CHANGE UP
BASOPHILS # BLD AUTO: 0 K/UL — SIGNIFICANT CHANGE UP (ref 0–0.2)
BASOPHILS NFR BLD AUTO: 0.3 % — SIGNIFICANT CHANGE UP (ref 0–2)
BENZODIAZ UR-MCNC: NEGATIVE — SIGNIFICANT CHANGE UP
BILIRUB SERPL-MCNC: 0.7 MG/DL — SIGNIFICANT CHANGE UP (ref 0.4–2)
BILIRUB UR-MCNC: NEGATIVE — SIGNIFICANT CHANGE UP
BUN SERPL-MCNC: 13 MG/DL — SIGNIFICANT CHANGE UP (ref 8–20)
CALCIUM SERPL-MCNC: 9.7 MG/DL — SIGNIFICANT CHANGE UP (ref 8.6–10.2)
CHLORIDE SERPL-SCNC: 100 MMOL/L — SIGNIFICANT CHANGE UP (ref 98–107)
CO2 SERPL-SCNC: 22 MMOL/L — SIGNIFICANT CHANGE UP (ref 22–29)
COCAINE METAB.OTHER UR-MCNC: NEGATIVE — SIGNIFICANT CHANGE UP
COLOR SPEC: YELLOW — SIGNIFICANT CHANGE UP
CREAT SERPL-MCNC: 0.7 MG/DL — SIGNIFICANT CHANGE UP (ref 0.5–1.3)
D DIMER BLD IA.RAPID-MCNC: 497 NG/ML DDU — HIGH
DIFF PNL FLD: ABNORMAL
EOSINOPHIL # BLD AUTO: 0 K/UL — SIGNIFICANT CHANGE UP (ref 0–0.5)
EOSINOPHIL NFR BLD AUTO: 0.3 % — SIGNIFICANT CHANGE UP (ref 0–6)
EPI CELLS # UR: SIGNIFICANT CHANGE UP
ETHANOL SERPL-MCNC: 302 MG/DL — SIGNIFICANT CHANGE UP
GLUCOSE SERPL-MCNC: 90 MG/DL — SIGNIFICANT CHANGE UP (ref 70–115)
GLUCOSE UR QL: NEGATIVE MG/DL — SIGNIFICANT CHANGE UP
HCG SERPL-ACNC: <5 MIU/ML — SIGNIFICANT CHANGE UP
HCT VFR BLD CALC: 42.1 % — SIGNIFICANT CHANGE UP (ref 37–47)
HGB BLD-MCNC: 14.1 G/DL — SIGNIFICANT CHANGE UP (ref 12–16)
INR BLD: 0.96 RATIO — SIGNIFICANT CHANGE UP (ref 0.88–1.16)
KETONES UR-MCNC: NEGATIVE — SIGNIFICANT CHANGE UP
LEUKOCYTE ESTERASE UR-ACNC: NEGATIVE — SIGNIFICANT CHANGE UP
LYMPHOCYTES # BLD AUTO: 3.9 K/UL — SIGNIFICANT CHANGE UP (ref 1–4.8)
LYMPHOCYTES # BLD AUTO: 51.1 % — SIGNIFICANT CHANGE UP (ref 20–55)
MCHC RBC-ENTMCNC: 29.9 PG — SIGNIFICANT CHANGE UP (ref 27–31)
MCHC RBC-ENTMCNC: 33.5 G/DL — SIGNIFICANT CHANGE UP (ref 32–36)
MCV RBC AUTO: 89.2 FL — SIGNIFICANT CHANGE UP (ref 81–99)
METHADONE UR-MCNC: NEGATIVE — SIGNIFICANT CHANGE UP
MONOCYTES # BLD AUTO: 0.5 K/UL — SIGNIFICANT CHANGE UP (ref 0–0.8)
MONOCYTES NFR BLD AUTO: 6.6 % — SIGNIFICANT CHANGE UP (ref 3–10)
NEUTROPHILS # BLD AUTO: 3.1 K/UL — SIGNIFICANT CHANGE UP (ref 1.8–8)
NEUTROPHILS NFR BLD AUTO: 41.2 % — SIGNIFICANT CHANGE UP (ref 37–73)
NITRITE UR-MCNC: NEGATIVE — SIGNIFICANT CHANGE UP
OPIATES UR-MCNC: NEGATIVE — SIGNIFICANT CHANGE UP
PCP SPEC-MCNC: SIGNIFICANT CHANGE UP
PCP UR-MCNC: NEGATIVE — SIGNIFICANT CHANGE UP
PH UR: 6 — SIGNIFICANT CHANGE UP (ref 5–8)
PLATELET # BLD AUTO: 248 K/UL — SIGNIFICANT CHANGE UP (ref 150–400)
POTASSIUM SERPL-MCNC: 3.5 MMOL/L — SIGNIFICANT CHANGE UP (ref 3.5–5.3)
POTASSIUM SERPL-SCNC: 3.5 MMOL/L — SIGNIFICANT CHANGE UP (ref 3.5–5.3)
PROT SERPL-MCNC: 8 G/DL — SIGNIFICANT CHANGE UP (ref 6.6–8.7)
PROT UR-MCNC: 30 MG/DL
PROTHROM AB SERPL-ACNC: 10.6 SEC — SIGNIFICANT CHANGE UP (ref 9.8–12.7)
RBC # BLD: 4.72 M/UL — SIGNIFICANT CHANGE UP (ref 4.4–5.2)
RBC # FLD: 13.6 % — SIGNIFICANT CHANGE UP (ref 11–15.6)
RBC CASTS # UR COMP ASSIST: ABNORMAL /HPF (ref 0–4)
SODIUM SERPL-SCNC: 145 MMOL/L — SIGNIFICANT CHANGE UP (ref 135–145)
SP GR SPEC: 1.01 — SIGNIFICANT CHANGE UP (ref 1.01–1.02)
THC UR QL: NEGATIVE — SIGNIFICANT CHANGE UP
TSH SERPL-MCNC: 0.45 UIU/ML — SIGNIFICANT CHANGE UP (ref 0.27–4.2)
UROBILINOGEN FLD QL: 1 MG/DL
WBC # BLD: 7.6 K/UL — SIGNIFICANT CHANGE UP (ref 4.8–10.8)
WBC # FLD AUTO: 7.6 K/UL — SIGNIFICANT CHANGE UP (ref 4.8–10.8)
WBC UR QL: SIGNIFICANT CHANGE UP

## 2018-07-08 PROCEDURE — 90792 PSYCH DIAG EVAL W/MED SRVCS: CPT | Mod: GT

## 2018-07-08 PROCEDURE — 71275 CT ANGIOGRAPHY CHEST: CPT | Mod: 26

## 2018-07-08 PROCEDURE — 71045 X-RAY EXAM CHEST 1 VIEW: CPT | Mod: 26

## 2018-07-08 PROCEDURE — 93010 ELECTROCARDIOGRAM REPORT: CPT

## 2018-07-08 PROCEDURE — 99285 EMERGENCY DEPT VISIT HI MDM: CPT

## 2018-07-08 RX ORDER — SODIUM CHLORIDE 9 MG/ML
1000 INJECTION INTRAMUSCULAR; INTRAVENOUS; SUBCUTANEOUS ONCE
Qty: 0 | Refills: 0 | Status: COMPLETED | OUTPATIENT
Start: 2018-07-08 | End: 2018-07-08

## 2018-07-08 RX ADMIN — SODIUM CHLORIDE 1000 MILLILITER(S): 9 INJECTION INTRAMUSCULAR; INTRAVENOUS; SUBCUTANEOUS at 17:30

## 2018-07-08 NOTE — ED PROVIDER NOTE - CARE PLAN
Principal Discharge DX:	Adjustment disorder with mixed anxiety and depressed mood  Secondary Diagnosis:	Alcohol abuse

## 2018-07-08 NOTE — ED ADULT TRIAGE NOTE - CHIEF COMPLAINT QUOTE
pt aretha amaro stating she is stressed and overwhlemed, been drinking all day, , md nicholas at bedside pt aretha amaro stating she is stressed and overwhlemed, been drinking all day, , md nicholas at bedside, no HI or Si

## 2018-07-08 NOTE — ED ADULT NURSE NOTE - CHIEF COMPLAINT QUOTE
pt aretha amaro stating she is stressed and overwhlemed, been drinking all day, , md nicholas at bedside, no HI or Si

## 2018-07-08 NOTE — ED ADULT NURSE REASSESSMENT NOTE - NS ED NURSE REASSESS COMMENT FT1
assumed care of patient. pt alert and oriented x4. VSS, afebrile. Pt resting in bed comfortably. pt in yellow gown.  normal sinus on monitor. pt denies pain. will continue to monitor

## 2018-07-08 NOTE — ED PROVIDER NOTE - OBJECTIVE STATEMENT
48 y/o F pt with hx of HTN presents to ED BIBA c/o anxiety, and depression. 46 y/o F pt with hx of HTN presents to ED BIBA c/o anxiety, and depression. Pt reports that she's been drinking today, and every other day as well, more than usual today because she's been getting into arguments with her . She says that whenever her and her  get int arguments she tends to drink a lot to cover up her issues with him. She states today she got tired of all the fighting, and got overwhelmed so she came into the ED to face her issues. She states that she feels depressed and anxious all the time, but she doesn't feel like harming herself or anyone else, she says "I just feel tired". She admits that about 5-6 years ago she had a lot of chest pain but was never hospitalized or saw a cardiologist for it. Pt reports that her family has a hx of high blood pressure, and that her father passed away from it, and most of her sisters have HTN. She is currently on HTN medicine. She is a current day smoker, smokes at least 4-5 cigarettes a day. Denies CP, SOB, dizziness, palpitations, leg pain, edema, diarrhea, vomiting, any recent travel, hx of blood clots, and any drug use. No further complaints at this time.   PCP: Annabel

## 2018-07-08 NOTE — ED PROVIDER NOTE - PROGRESS NOTE DETAILS
Davis: received sign out, patient pending eval for SW and SBIRT; has safe place to go, eager for outpt resources; cleared by psych for d/c; OK for d/c

## 2018-07-08 NOTE — ED PROVIDER NOTE - CARDIAC, MLM
Normal rate, regular rhythm.  Heart sounds S1, S2.  No murmurs, rubs or gallops. Tachycardic.  Heart sounds S1, S2.  No murmurs, rubs or gallops.

## 2018-07-09 VITALS
OXYGEN SATURATION: 99 % | HEART RATE: 80 BPM | DIASTOLIC BLOOD PRESSURE: 70 MMHG | SYSTOLIC BLOOD PRESSURE: 150 MMHG | RESPIRATION RATE: 20 BRPM

## 2018-07-09 DIAGNOSIS — F10.10 ALCOHOL ABUSE, UNCOMPLICATED: ICD-10-CM

## 2018-07-09 DIAGNOSIS — F43.23 ADJUSTMENT DISORDER WITH MIXED ANXIETY AND DEPRESSED MOOD: ICD-10-CM

## 2018-07-09 LAB — ETHANOL SERPL-MCNC: <10 MG/DL — SIGNIFICANT CHANGE UP

## 2018-07-09 PROCEDURE — 36415 COLL VENOUS BLD VENIPUNCTURE: CPT

## 2018-07-09 PROCEDURE — 71045 X-RAY EXAM CHEST 1 VIEW: CPT

## 2018-07-09 PROCEDURE — 81001 URINALYSIS AUTO W/SCOPE: CPT

## 2018-07-09 PROCEDURE — 85610 PROTHROMBIN TIME: CPT

## 2018-07-09 PROCEDURE — 80307 DRUG TEST PRSMV CHEM ANLYZR: CPT

## 2018-07-09 PROCEDURE — 85379 FIBRIN DEGRADATION QUANT: CPT

## 2018-07-09 PROCEDURE — 84443 ASSAY THYROID STIM HORMONE: CPT

## 2018-07-09 PROCEDURE — 84702 CHORIONIC GONADOTROPIN TEST: CPT

## 2018-07-09 PROCEDURE — 85730 THROMBOPLASTIN TIME PARTIAL: CPT

## 2018-07-09 PROCEDURE — 85027 COMPLETE CBC AUTOMATED: CPT

## 2018-07-09 PROCEDURE — 99285 EMERGENCY DEPT VISIT HI MDM: CPT | Mod: 25

## 2018-07-09 PROCEDURE — 71275 CT ANGIOGRAPHY CHEST: CPT

## 2018-07-09 PROCEDURE — 93005 ELECTROCARDIOGRAM TRACING: CPT

## 2018-07-09 PROCEDURE — 80053 COMPREHEN METABOLIC PANEL: CPT

## 2018-07-09 NOTE — ED ADULT NURSE REASSESSMENT NOTE - NS ED NURSE REASSESS COMMENT FT1
received call from telepsych requesting pt be moved to private area with 1:1 for evaluation. telepsych informed RN currently in middle of pt care and need additional time to prepare pt for eval. as per telepsych, "were busy so we will move on and come back."

## 2018-07-09 NOTE — ED BEHAVIORAL HEALTH ASSESSMENT NOTE - REFERRAL / APPOINTMENT DETAILS
Referrals provided to patient for local outpatient psych clinics and substance abuse treatment centers
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
## 2018-07-09 NOTE — ED ADULT NURSE REASSESSMENT NOTE - NS ED NURSE REASSESS COMMENT FT1
pt resting in bed comfortably with eyes closed. pt denies pain, in no distress, voices no complaints. will continue to monitor

## 2018-07-09 NOTE — ED ADULT NURSE REASSESSMENT NOTE - NS ED NURSE REASSESS COMMENT FT1
assumed pt care this am, BAL level now normal. pt is alert, await SW and SBIRT team for assistance. wait final dispo from MD.

## 2018-07-09 NOTE — ED BEHAVIORAL HEALTH ASSESSMENT NOTE - SAFETY PLAN DETAILS
Return to ED or call 911 with worsening symptoms, ETOH w/d, SI/HI, or any other imminent safety concerns.

## 2018-07-09 NOTE — ED ADULT NURSE REASSESSMENT NOTE - NS ED NURSE REASSESS COMMENT FT1
pt a+ox3, sitting up comfortably in bed eating breakfast. pt denies any pain or discomfort. pt in no apparent distress. per MD, rpt BAL drawn and sent to lab, awaiting results.
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
## 2018-07-09 NOTE — ED BEHAVIORAL HEALTH ASSESSMENT NOTE - ACCESS TO FIREARM
"Chief Complaint   Patient presents with     Hypertension     COPD     Panel Management     MyChart, FIT, Tobacco Cessation       Initial BP (!) 170/110  Pulse 74  Temp 98.7  F (37.1  C) (Temporal)  Resp 10  Ht 5' 9.69\" (1.77 m)  Wt 207 lb 3.2 oz (94 kg)  SpO2 97%  BMI 30 kg/m2 Estimated body mass index is 30 kg/(m^2) as calculated from the following:    Height as of this encounter: 5' 9.69\" (1.77 m).    Weight as of this encounter: 207 lb 3.2 oz (94 kg).  Medication Reconciliation: complete     Heydi Arellano CMA  "
No

## 2018-07-09 NOTE — ED ADULT NURSE REASSESSMENT NOTE - NS ED NURSE REASSESS COMMENT FT1
pt ambulating with steady gait to bathroom, in no apparent distress or discomfort. awaiting tele psych eval, will continue to monitor.

## 2018-07-09 NOTE — ED BEHAVIORAL HEALTH ASSESSMENT NOTE - DIFFERENTIAL
Adjustment disorder, with anxious and depressed mood, substance induced mood and anxiety disorder  Alcohol abuse
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
## 2018-07-09 NOTE — ED BEHAVIORAL HEALTH ASSESSMENT NOTE - DESCRIPTION (FIRST USE, LAST USE, QUANTITY, FREQUENCY, DURATION)
smokes daily age 18 was first use. States that for the last several weeks she has been drinking "almost daily" 4-5 drinks/day. Recent bringe drinking per HPI

## 2018-07-09 NOTE — ED BEHAVIORAL HEALTH ASSESSMENT NOTE - RISK ASSESSMENT
Risk factors include alcohol abuse, not currently in psych treatment, prior suicide attempts, prior inpt hospitalization, and acute marital stressors. Protective factors include intact social support from friends, no current SI/HI, engaged in work, has children, no legal problems, no psychosis, help seeking, and able to engage in safety planning. At this time pt is low imminent risk of harm but at elevated chronic risk. Plan to mitigate risk by providing outpatient psychiatry and substance abuse treatment referrals.

## 2018-07-09 NOTE — ED BEHAVIORAL HEALTH ASSESSMENT NOTE - DESCRIPTION
HTN- pt states she is taking a pill for this but does not know the name. States she is only intermittently compliant. NKDA. See HPI Calm, cooperative, and pleasant.    Vital Signs Last 24 Hrs  T(C): 36.9 (09 Jul 2018 02:25), Max: 36.9 (09 Jul 2018 02:25)  T(F): 98.5 (09 Jul 2018 02:25), Max: 98.5 (09 Jul 2018 02:25)  HR: 77 (09 Jul 2018 06:19) (77 - 160)  BP: 166/87 (09 Jul 2018 06:19) (104/72 - 166/87)  BP(mean): --  RR: 18 (09 Jul 2018 06:19) (16 - 20)  SpO2: 99% (09 Jul 2018 06:19) (97% - 99%)

## 2018-07-09 NOTE — ED BEHAVIORAL HEALTH ASSESSMENT NOTE - SUMMARY
Patient is a 47-year-old, , -American female, with reported PPhx of anxiety and depression, 2 prior inpt hospitalizations (one in 1995 s/o suicide attempt by overdose and one in Dec 2017 for depression), 1 prior suicide attempt, denies hx of violence/ legal problems, significant hx of alcohol abuse, denies w/d symptoms or DT/seizures, and Pmhx of HTN and insomnia. Patient self presents today for increased anxiety and "feeling overwhelmed" in the context of acute marital stressors and 2 days of binge drinking. BAL on arrival was >300. Upon achieving sobriety, pt states she is feeling "much calmer" and requests to go home. She denies symptoms of any major mood episodes, denies psychotic symptoms, and denies SI/HI. She reports motivation to achieve sobriety and is accepting of substance treatment referrals. At this time pt is at low imminent risk of harm and involuntary inpt hospitalization is not warranted.

## 2018-07-14 ENCOUNTER — EMERGENCY (EMERGENCY)
Facility: HOSPITAL | Age: 48
LOS: 1 days | Discharge: HOME | End: 2018-07-14
Attending: EMERGENCY MEDICINE
Payer: COMMERCIAL

## 2018-07-14 VITALS
TEMPERATURE: 98 F | WEIGHT: 100.09 LBS | HEART RATE: 80 BPM | OXYGEN SATURATION: 98 % | HEIGHT: 67 IN | RESPIRATION RATE: 18 BRPM | SYSTOLIC BLOOD PRESSURE: 136 MMHG | DIASTOLIC BLOOD PRESSURE: 86 MMHG

## 2018-07-14 DIAGNOSIS — Z90.710 ACQUIRED ABSENCE OF BOTH CERVIX AND UTERUS: Chronic | ICD-10-CM

## 2018-07-14 DIAGNOSIS — Z98.51 TUBAL LIGATION STATUS: Chronic | ICD-10-CM

## 2018-07-14 DIAGNOSIS — Z98.890 OTHER SPECIFIED POSTPROCEDURAL STATES: Chronic | ICD-10-CM

## 2018-07-14 DIAGNOSIS — N89.8 OTHER SPECIFIED NONINFLAMMATORY DISORDERS OF VAGINA: Chronic | ICD-10-CM

## 2018-07-14 PROCEDURE — 99284 EMERGENCY DEPT VISIT MOD MDM: CPT

## 2018-07-14 PROCEDURE — 99285 EMERGENCY DEPT VISIT HI MDM: CPT
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
## 2018-07-14 NOTE — ED PROVIDER NOTE - PROGRESS NOTE DETAILS
patient without SI or HI; has been seen by ASHLEY and Price in past without acute psychiatric complaints currently; resources provided to patient for detox and rehab; patient walked herself into ED, she is fully ambulatory, dressing self, speaking in clear and complete sentences; she is safe for d/c

## 2018-07-14 NOTE — ED ADULT NURSE REASSESSMENT NOTE - NS ED NURSE REASSESS COMMENT FT1
pt s/p consult with  ,provided multiuple resources for outpatient facilities. pt given bus tickets and is cleared to be discharged home. pt states she feels better

## 2018-07-14 NOTE — CHART NOTE - NSCHARTNOTEFT_GEN_A_CORE
SW Note - met with pt and pt expresses a desire to be sober but an unwillingness to go to detox or rehab. pt given outpatient and inpatient resources and the 24/7 hotline. pt states she will follow up when she is feeling better. Bus tickets given for transport home.

## 2018-07-14 NOTE — ED ADULT TRIAGE NOTE - CHIEF COMPLAINT QUOTE
Patient BIBA, ambulatory, states she has been drinking since yesterday, looking for help with her alcohol abuse and depression, states she is not suicidal or homicidal

## 2018-07-14 NOTE — ED ADULT NURSE NOTE - OBJECTIVE STATEMENT
pt states she drank today unknown how much. pt says she drinks because she feels sad . pt is sad because of marital issues.

## 2018-12-13 ENCOUNTER — EMERGENCY (EMERGENCY)
Facility: HOSPITAL | Age: 48
LOS: 1 days | Discharge: HOME | End: 2018-12-13
Attending: STUDENT IN AN ORGANIZED HEALTH CARE EDUCATION/TRAINING PROGRAM
Payer: COMMERCIAL

## 2018-12-13 VITALS
HEIGHT: 66 IN | OXYGEN SATURATION: 99 % | RESPIRATION RATE: 18 BRPM | WEIGHT: 110.01 LBS | SYSTOLIC BLOOD PRESSURE: 150 MMHG | HEART RATE: 118 BPM | DIASTOLIC BLOOD PRESSURE: 92 MMHG | TEMPERATURE: 99 F

## 2018-12-13 DIAGNOSIS — Z98.51 TUBAL LIGATION STATUS: Chronic | ICD-10-CM

## 2018-12-13 DIAGNOSIS — Z90.710 ACQUIRED ABSENCE OF BOTH CERVIX AND UTERUS: Chronic | ICD-10-CM

## 2018-12-13 DIAGNOSIS — N89.8 OTHER SPECIFIED NONINFLAMMATORY DISORDERS OF VAGINA: Chronic | ICD-10-CM

## 2018-12-13 DIAGNOSIS — Z98.890 OTHER SPECIFIED POSTPROCEDURAL STATES: Chronic | ICD-10-CM

## 2018-12-13 PROBLEM — I10 ESSENTIAL (PRIMARY) HYPERTENSION: Chronic | Status: ACTIVE | Noted: 2017-11-30

## 2018-12-13 PROBLEM — F32.9 MAJOR DEPRESSIVE DISORDER, SINGLE EPISODE, UNSPECIFIED: Chronic | Status: ACTIVE | Noted: 2018-07-14

## 2018-12-13 LAB
ALBUMIN SERPL ELPH-MCNC: 4.3 G/DL — SIGNIFICANT CHANGE UP (ref 3.3–5.2)
ALP SERPL-CCNC: 76 U/L — SIGNIFICANT CHANGE UP (ref 40–120)
ALT FLD-CCNC: 14 U/L — SIGNIFICANT CHANGE UP
AMPHET UR-MCNC: NEGATIVE — SIGNIFICANT CHANGE UP
ANION GAP SERPL CALC-SCNC: 15 MMOL/L — SIGNIFICANT CHANGE UP (ref 5–17)
APAP SERPL-MCNC: <7.5 UG/ML — LOW (ref 10–26)
APPEARANCE UR: CLEAR — SIGNIFICANT CHANGE UP
AST SERPL-CCNC: 22 U/L — SIGNIFICANT CHANGE UP
BACTERIA # UR AUTO: ABNORMAL
BARBITURATES UR SCN-MCNC: NEGATIVE — SIGNIFICANT CHANGE UP
BASOPHILS # BLD AUTO: 0 K/UL — SIGNIFICANT CHANGE UP (ref 0–0.2)
BASOPHILS NFR BLD AUTO: 0.2 % — SIGNIFICANT CHANGE UP (ref 0–2)
BENZODIAZ UR-MCNC: NEGATIVE — SIGNIFICANT CHANGE UP
BILIRUB SERPL-MCNC: 0.7 MG/DL — SIGNIFICANT CHANGE UP (ref 0.4–2)
BILIRUB UR-MCNC: NEGATIVE — SIGNIFICANT CHANGE UP
BUN SERPL-MCNC: 13 MG/DL — SIGNIFICANT CHANGE UP (ref 8–20)
CALCIUM SERPL-MCNC: 9.5 MG/DL — SIGNIFICANT CHANGE UP (ref 8.6–10.2)
CHLORIDE SERPL-SCNC: 108 MMOL/L — HIGH (ref 98–107)
CO2 SERPL-SCNC: 24 MMOL/L — SIGNIFICANT CHANGE UP (ref 22–29)
COCAINE METAB.OTHER UR-MCNC: NEGATIVE — SIGNIFICANT CHANGE UP
COLOR SPEC: YELLOW — SIGNIFICANT CHANGE UP
CREAT SERPL-MCNC: 0.49 MG/DL — LOW (ref 0.5–1.3)
DIFF PNL FLD: ABNORMAL
EOSINOPHIL # BLD AUTO: 0 K/UL — SIGNIFICANT CHANGE UP (ref 0–0.5)
EOSINOPHIL NFR BLD AUTO: 0.3 % — SIGNIFICANT CHANGE UP (ref 0–6)
EPI CELLS # UR: SIGNIFICANT CHANGE UP
ETHANOL SERPL-MCNC: 261 MG/DL — SIGNIFICANT CHANGE UP
GLUCOSE SERPL-MCNC: 91 MG/DL — SIGNIFICANT CHANGE UP (ref 70–115)
GLUCOSE UR QL: NEGATIVE MG/DL — SIGNIFICANT CHANGE UP
HCG SERPL-ACNC: <4 MIU/ML — SIGNIFICANT CHANGE UP
HCT VFR BLD CALC: 38 % — SIGNIFICANT CHANGE UP (ref 37–47)
HGB BLD-MCNC: 12.4 G/DL — SIGNIFICANT CHANGE UP (ref 12–16)
KETONES UR-MCNC: NEGATIVE — SIGNIFICANT CHANGE UP
LEUKOCYTE ESTERASE UR-ACNC: NEGATIVE — SIGNIFICANT CHANGE UP
LYMPHOCYTES # BLD AUTO: 2.6 K/UL — SIGNIFICANT CHANGE UP (ref 1–4.8)
LYMPHOCYTES # BLD AUTO: 39.7 % — SIGNIFICANT CHANGE UP (ref 20–55)
MCHC RBC-ENTMCNC: 29.2 PG — SIGNIFICANT CHANGE UP (ref 27–31)
MCHC RBC-ENTMCNC: 32.6 G/DL — SIGNIFICANT CHANGE UP (ref 32–36)
MCV RBC AUTO: 89.6 FL — SIGNIFICANT CHANGE UP (ref 81–99)
METHADONE UR-MCNC: NEGATIVE — SIGNIFICANT CHANGE UP
MONOCYTES # BLD AUTO: 0.6 K/UL — SIGNIFICANT CHANGE UP (ref 0–0.8)
MONOCYTES NFR BLD AUTO: 8.6 % — SIGNIFICANT CHANGE UP (ref 3–10)
NEUTROPHILS # BLD AUTO: 3.4 K/UL — SIGNIFICANT CHANGE UP (ref 1.8–8)
NEUTROPHILS NFR BLD AUTO: 51 % — SIGNIFICANT CHANGE UP (ref 37–73)
NITRITE UR-MCNC: NEGATIVE — SIGNIFICANT CHANGE UP
OPIATES UR-MCNC: NEGATIVE — SIGNIFICANT CHANGE UP
PCP SPEC-MCNC: SIGNIFICANT CHANGE UP
PCP UR-MCNC: NEGATIVE — SIGNIFICANT CHANGE UP
PH UR: 5 — SIGNIFICANT CHANGE UP (ref 5–8)
PLATELET # BLD AUTO: 194 K/UL — SIGNIFICANT CHANGE UP (ref 150–400)
POTASSIUM SERPL-MCNC: 3.7 MMOL/L — SIGNIFICANT CHANGE UP (ref 3.5–5.3)
POTASSIUM SERPL-SCNC: 3.7 MMOL/L — SIGNIFICANT CHANGE UP (ref 3.5–5.3)
PROT SERPL-MCNC: 7.3 G/DL — SIGNIFICANT CHANGE UP (ref 6.6–8.7)
PROT UR-MCNC: 15 MG/DL
RBC # BLD: 4.24 M/UL — LOW (ref 4.4–5.2)
RBC # FLD: 13.9 % — SIGNIFICANT CHANGE UP (ref 11–15.6)
RBC CASTS # UR COMP ASSIST: ABNORMAL /HPF (ref 0–4)
SALICYLATES SERPL-MCNC: <0.6 MG/DL — LOW (ref 10–20)
SODIUM SERPL-SCNC: 147 MMOL/L — HIGH (ref 135–145)
SP GR SPEC: 1.02 — SIGNIFICANT CHANGE UP (ref 1.01–1.02)
THC UR QL: NEGATIVE — SIGNIFICANT CHANGE UP
UROBILINOGEN FLD QL: NEGATIVE MG/DL — SIGNIFICANT CHANGE UP
WBC # BLD: 6.6 K/UL — SIGNIFICANT CHANGE UP (ref 4.8–10.8)
WBC # FLD AUTO: 6.6 K/UL — SIGNIFICANT CHANGE UP (ref 4.8–10.8)
WBC UR QL: SIGNIFICANT CHANGE UP

## 2018-12-13 PROCEDURE — 99284 EMERGENCY DEPT VISIT MOD MDM: CPT

## 2018-12-13 RX ORDER — SODIUM CHLORIDE 9 MG/ML
1000 INJECTION INTRAMUSCULAR; INTRAVENOUS; SUBCUTANEOUS ONCE
Qty: 0 | Refills: 0 | Status: COMPLETED | OUTPATIENT
Start: 2018-12-13 | End: 2018-12-13

## 2018-12-13 RX ADMIN — SODIUM CHLORIDE 1000 MILLILITER(S): 9 INJECTION INTRAMUSCULAR; INTRAVENOUS; SUBCUTANEOUS at 21:51

## 2018-12-13 NOTE — ED PROVIDER NOTE - OBJECTIVE STATEMENT
48 year old female with a h/o depression and alcohol abuse was found on the street intoxicated. pt states that she has been having a lot of problems at home at had been drinking for most of the morning. She denies any suicidal ideation

## 2018-12-13 NOTE — ED ADULT NURSE REASSESSMENT NOTE - NS ED NURSE REASSESS COMMENT FT1
Pt awake, a&ox4, stated she "drank a lot today", pt states she has been having a rough time at home. Will continue to monitor.

## 2018-12-13 NOTE — ED PROVIDER NOTE - PLAN OF CARE
1) Please do not drink alcohol or ingest any mind-altering substances and drive or make important decisions.   Please cut down on your substance use/abuse.  2) You were given information about substance use/abuse.  Please review this literature.  3) If you have any worsening of symptoms please return to the ED immediately  4) Please follow-up with your primary care doctor in the next 5-7 days for evaluation of symptoms.

## 2018-12-13 NOTE — ED PROVIDER NOTE - CARE PLAN
Principal Discharge DX:	ETOH abuse  Assessment and plan of treatment:	1) Please do not drink alcohol or ingest any mind-altering substances and drive or make important decisions.   Please cut down on your substance use/abuse.  2) You were given information about substance use/abuse.  Please review this literature.  3) If you have any worsening of symptoms please return to the ED immediately  4) Please follow-up with your primary care doctor in the next 5-7 days for evaluation of symptoms.

## 2018-12-13 NOTE — ED ADULT TRIAGE NOTE - CHIEF COMPLAINT QUOTE
found walking in street intoxicated. Pt admits to drinking alcohol today.  Undressed, placed in yellow gown, and belongings secured by SNA

## 2018-12-13 NOTE — ED ADULT NURSE NOTE - OBJECTIVE STATEMENT
Assumed pt care at 1700.  Pt intoxicated, unable to provide any information at this time, no acute s/s of respiratory distress noted or reported, will continue to monitor.

## 2018-12-13 NOTE — ED PROVIDER NOTE - PROGRESS NOTE DETAILS
Pt encourage PO H2O throughout stay, has been drinking. Patient seen and reassessed.   Patient is AAOX3, NAD, able to ambulate, non tremulous, and tolerating oral intake.   VSS.   Patient states that they feel safe going home and have a safe way to get home.   Give copy of tests performed.   Discussed need to cut down on alcohol intake, given literature.

## 2018-12-14 VITALS
HEART RATE: 84 BPM | RESPIRATION RATE: 16 BRPM | SYSTOLIC BLOOD PRESSURE: 116 MMHG | DIASTOLIC BLOOD PRESSURE: 68 MMHG | OXYGEN SATURATION: 96 % | TEMPERATURE: 99 F

## 2018-12-14 LAB
ANION GAP SERPL CALC-SCNC: 13 MMOL/L — SIGNIFICANT CHANGE UP (ref 5–17)
BUN SERPL-MCNC: 13 MG/DL — SIGNIFICANT CHANGE UP (ref 8–20)
CALCIUM SERPL-MCNC: 8.2 MG/DL — LOW (ref 8.6–10.2)
CHLORIDE SERPL-SCNC: 111 MMOL/L — HIGH (ref 98–107)
CO2 SERPL-SCNC: 24 MMOL/L — SIGNIFICANT CHANGE UP (ref 22–29)
CREAT SERPL-MCNC: 0.58 MG/DL — SIGNIFICANT CHANGE UP (ref 0.5–1.3)
GLUCOSE SERPL-MCNC: 88 MG/DL — SIGNIFICANT CHANGE UP (ref 70–115)
POTASSIUM SERPL-MCNC: 4.4 MMOL/L — SIGNIFICANT CHANGE UP (ref 3.5–5.3)
POTASSIUM SERPL-SCNC: 4.4 MMOL/L — SIGNIFICANT CHANGE UP (ref 3.5–5.3)
SODIUM SERPL-SCNC: 148 MMOL/L — HIGH (ref 135–145)

## 2018-12-14 PROCEDURE — 84702 CHORIONIC GONADOTROPIN TEST: CPT

## 2018-12-14 PROCEDURE — 36415 COLL VENOUS BLD VENIPUNCTURE: CPT

## 2018-12-14 PROCEDURE — 80053 COMPREHEN METABOLIC PANEL: CPT

## 2018-12-14 PROCEDURE — 85027 COMPLETE CBC AUTOMATED: CPT

## 2018-12-14 PROCEDURE — 80048 BASIC METABOLIC PNL TOTAL CA: CPT

## 2018-12-14 PROCEDURE — 81001 URINALYSIS AUTO W/SCOPE: CPT

## 2018-12-14 PROCEDURE — 80307 DRUG TEST PRSMV CHEM ANLYZR: CPT

## 2018-12-14 PROCEDURE — 99285 EMERGENCY DEPT VISIT HI MDM: CPT

## 2018-12-14 NOTE — ED ADULT NURSE REASSESSMENT NOTE - NS ED NURSE REASSESS COMMENT FT1
Assumed care for pt at 0130.  Report received from offgoing RN, charting as noted. Patient is resting comfortably in bed, encouraging PO intake. Pt to be dc'd as per dr castillo. Assumed care for pt at 0130.  Report received from offgoing RN, charting as noted. Patient received in yellow gown, belongings out of reach.  Patient is resting comfortably in bed, encouraging PO intake. Pt to be dc'd as per dr castillo.

## 2019-03-14 NOTE — ED ADULT NURSE NOTE - HOW MANY DRINKS CONTAINING ALCOHOL DO YOU HAVE ON A TYPICAL DAY WHEN YOU ARE DRINKING?
Referral for new Neurosurgeon instead of Shoaib.  She is not happy with him.  She would like you to make a recommendation.  
Referral was ordered yesterday for Dr. Rodriguez, as patient requested.  
She states that she is able to see him and it is covered but she cannot have surgery with him because that is not covered.  I asked her to contact her insurance company and get a list of MD that were in her network that would allow her to have both a visit and surgery if needed.  Pt state she will get this list.  
Spoke with pt and she still wants to see Dr. Rodriguez, she will let us know if she needs a referral somewhere else at some point.  
That's strange, but we will wait for list from patient.  
10 or more

## 2019-04-01 NOTE — ED BEHAVIORAL HEALTH ASSESSMENT NOTE - DOMICILED WITH
[FreeTextEntry1] : gerd She as recurrent symptoms and will add ppi and we discussed ppi side effects such as renal  heart, mg def b12 def Alzheimer and osteoporosis . WE will restart lower dose and give famotidine at bedtime and then give her intermittent drug holidays.  She has improved liver function since stopping the tamoxifen.  she has fatty liver and has seen dr guajardo. she should receive hep B vaccination and asked her to discuss with Dr Moe to give her the vaccine.  She should have it repeated sin e it was done in May 2017.   her more recent liver enzymes were in a normal range but are still elevated for a woman.   She will continue to follow a low fat diet.   Family

## 2019-08-17 ENCOUNTER — EMERGENCY (EMERGENCY)
Facility: HOSPITAL | Age: 49
LOS: 1 days | Discharge: HOME | End: 2019-08-17
Attending: EMERGENCY MEDICINE
Payer: COMMERCIAL

## 2019-08-17 ENCOUNTER — EMERGENCY (EMERGENCY)
Facility: HOSPITAL | Age: 49
LOS: 1 days | End: 2019-08-17

## 2019-08-17 VITALS
HEART RATE: 92 BPM | WEIGHT: 111.99 LBS | TEMPERATURE: 99 F | DIASTOLIC BLOOD PRESSURE: 107 MMHG | RESPIRATION RATE: 18 BRPM | HEIGHT: 67 IN | SYSTOLIC BLOOD PRESSURE: 200 MMHG | OXYGEN SATURATION: 100 %

## 2019-08-17 VITALS — DIASTOLIC BLOOD PRESSURE: 110 MMHG | SYSTOLIC BLOOD PRESSURE: 195 MMHG

## 2019-08-17 DIAGNOSIS — Z90.710 ACQUIRED ABSENCE OF BOTH CERVIX AND UTERUS: Chronic | ICD-10-CM

## 2019-08-17 DIAGNOSIS — Z98.51 TUBAL LIGATION STATUS: Chronic | ICD-10-CM

## 2019-08-17 DIAGNOSIS — N89.8 OTHER SPECIFIED NONINFLAMMATORY DISORDERS OF VAGINA: Chronic | ICD-10-CM

## 2019-08-17 DIAGNOSIS — Z98.890 OTHER SPECIFIED POSTPROCEDURAL STATES: Chronic | ICD-10-CM

## 2019-08-17 PROCEDURE — 99284 EMERGENCY DEPT VISIT MOD MDM: CPT

## 2019-08-17 PROCEDURE — 99283 EMERGENCY DEPT VISIT LOW MDM: CPT

## 2019-08-17 RX ORDER — AMLODIPINE BESYLATE 2.5 MG/1
5 TABLET ORAL ONCE
Refills: 0 | Status: COMPLETED | OUTPATIENT
Start: 2019-08-17 | End: 2019-08-17

## 2019-08-17 RX ORDER — AMLODIPINE BESYLATE 2.5 MG/1
1 TABLET ORAL
Qty: 21 | Refills: 0
Start: 2019-08-17 | End: 2019-09-06

## 2019-08-17 RX ADMIN — AMLODIPINE BESYLATE 5 MILLIGRAM(S): 2.5 TABLET ORAL at 20:08

## 2019-08-17 NOTE — ED STATDOCS - OBJECTIVE STATEMENT
47 y/o F pt with PMHx of HTN presents to the ED c/o HTN. Pt reports she checked her blood pressure 175/(100 something, pt cannot clearly recall) last night at work, 152/96 5 hours ago, checked a third time at work today with 175 systolic. Pt's systolic blood pressure throughout the day has been between systolic 160s and 190s. Pt says that she has a gas bubble in her epigastric area that she usually gets when she needs to burp. She last took Amlodipine for her HTN December 2018 against medical advice and notes that she is currently afraid of taking it because her Amlodipine is nearly a year old. Pt is asymptomatic. Denies HA, CP, dizziness, vomit, SOB, abd pain, acute changes in vision. No further acute complaints at this time.

## 2019-08-17 NOTE — ED STATDOCS - CLINICAL SUMMARY MEDICAL DECISION MAKING FREE TEXT BOX
Long discussion with pt regarding her HTN and HTN medication usage. Emphasized the seriousness of her condition and that she needs to follow up with her PMD this week. Pt d/c and instructed to return to ER if any symptoms emerge.

## 2020-04-08 NOTE — ED PROVIDER NOTE - RESPIRATORY NEGATIVE STATEMENT, MLM
no chest pain, no cough, and no shortness of breath. Spine appears normal, range of motion is not limited, no muscle or joint tenderness

## 2020-06-05 ENCOUNTER — INPATIENT (INPATIENT)
Facility: HOSPITAL | Age: 50
LOS: 2 days | Discharge: HOME | DRG: 282 | End: 2020-06-08
Attending: INTERNAL MEDICINE | Admitting: INTERNAL MEDICINE
Payer: COMMERCIAL

## 2020-06-05 VITALS
OXYGEN SATURATION: 100 % | WEIGHT: 110.01 LBS | DIASTOLIC BLOOD PRESSURE: 90 MMHG | RESPIRATION RATE: 16 BRPM | HEART RATE: 85 BPM | SYSTOLIC BLOOD PRESSURE: 145 MMHG | TEMPERATURE: 99 F

## 2020-06-05 DIAGNOSIS — Z98.890 OTHER SPECIFIED POSTPROCEDURAL STATES: Chronic | ICD-10-CM

## 2020-06-05 DIAGNOSIS — Z98.51 TUBAL LIGATION STATUS: Chronic | ICD-10-CM

## 2020-06-05 DIAGNOSIS — N89.8 OTHER SPECIFIED NONINFLAMMATORY DISORDERS OF VAGINA: Chronic | ICD-10-CM

## 2020-06-05 DIAGNOSIS — R07.9 CHEST PAIN, UNSPECIFIED: ICD-10-CM

## 2020-06-05 DIAGNOSIS — Z90.710 ACQUIRED ABSENCE OF BOTH CERVIX AND UTERUS: Chronic | ICD-10-CM

## 2020-06-05 LAB
ADD ON TEST-SPECIMEN IN LAB: SIGNIFICANT CHANGE UP
ALBUMIN SERPL ELPH-MCNC: 4.5 G/DL — SIGNIFICANT CHANGE UP (ref 3.3–5)
ALP SERPL-CCNC: 104 U/L — SIGNIFICANT CHANGE UP (ref 40–120)
ALT FLD-CCNC: 31 U/L — SIGNIFICANT CHANGE UP (ref 12–78)
ANION GAP SERPL CALC-SCNC: 8 MMOL/L — SIGNIFICANT CHANGE UP (ref 5–17)
APPEARANCE UR: CLEAR — SIGNIFICANT CHANGE UP
AST SERPL-CCNC: 31 U/L — SIGNIFICANT CHANGE UP (ref 15–37)
BASOPHILS # BLD AUTO: 0.02 K/UL — SIGNIFICANT CHANGE UP (ref 0–0.2)
BASOPHILS NFR BLD AUTO: 0.3 % — SIGNIFICANT CHANGE UP (ref 0–2)
BILIRUB SERPL-MCNC: 1.1 MG/DL — SIGNIFICANT CHANGE UP (ref 0.2–1.2)
BILIRUB UR-MCNC: NEGATIVE — SIGNIFICANT CHANGE UP
BUN SERPL-MCNC: 10 MG/DL — SIGNIFICANT CHANGE UP (ref 7–23)
CALCIUM SERPL-MCNC: 9.4 MG/DL — SIGNIFICANT CHANGE UP (ref 8.5–10.1)
CHLORIDE SERPL-SCNC: 100 MMOL/L — SIGNIFICANT CHANGE UP (ref 96–108)
CO2 SERPL-SCNC: 27 MMOL/L — SIGNIFICANT CHANGE UP (ref 22–31)
COLOR SPEC: YELLOW — SIGNIFICANT CHANGE UP
CREAT SERPL-MCNC: 0.9 MG/DL — SIGNIFICANT CHANGE UP (ref 0.5–1.3)
DIFF PNL FLD: ABNORMAL
EOSINOPHIL # BLD AUTO: 0.02 K/UL — SIGNIFICANT CHANGE UP (ref 0–0.5)
EOSINOPHIL NFR BLD AUTO: 0.3 % — SIGNIFICANT CHANGE UP (ref 0–6)
GLUCOSE SERPL-MCNC: 87 MG/DL — SIGNIFICANT CHANGE UP (ref 70–99)
GLUCOSE UR QL: NEGATIVE MG/DL — SIGNIFICANT CHANGE UP
HCT VFR BLD CALC: 38.9 % — SIGNIFICANT CHANGE UP (ref 34.5–45)
HGB BLD-MCNC: 12.8 G/DL — SIGNIFICANT CHANGE UP (ref 11.5–15.5)
IMM GRANULOCYTES NFR BLD AUTO: 0.1 % — SIGNIFICANT CHANGE UP (ref 0–1.5)
KETONES UR-MCNC: ABNORMAL
LACTATE SERPL-SCNC: 2.5 MMOL/L — HIGH (ref 0.7–2)
LEUKOCYTE ESTERASE UR-ACNC: NEGATIVE — SIGNIFICANT CHANGE UP
LIDOCAIN IGE QN: 81 U/L — SIGNIFICANT CHANGE UP (ref 73–393)
LYMPHOCYTES # BLD AUTO: 1.93 K/UL — SIGNIFICANT CHANGE UP (ref 1–3.3)
LYMPHOCYTES # BLD AUTO: 28.8 % — SIGNIFICANT CHANGE UP (ref 13–44)
MCHC RBC-ENTMCNC: 31.4 PG — SIGNIFICANT CHANGE UP (ref 27–34)
MCHC RBC-ENTMCNC: 32.9 GM/DL — SIGNIFICANT CHANGE UP (ref 32–36)
MCV RBC AUTO: 95.6 FL — SIGNIFICANT CHANGE UP (ref 80–100)
MONOCYTES # BLD AUTO: 0.62 K/UL — SIGNIFICANT CHANGE UP (ref 0–0.9)
MONOCYTES NFR BLD AUTO: 9.3 % — SIGNIFICANT CHANGE UP (ref 2–14)
NEUTROPHILS # BLD AUTO: 4.1 K/UL — SIGNIFICANT CHANGE UP (ref 1.8–7.4)
NEUTROPHILS NFR BLD AUTO: 61.2 % — SIGNIFICANT CHANGE UP (ref 43–77)
NITRITE UR-MCNC: NEGATIVE — SIGNIFICANT CHANGE UP
PH UR: 7 — SIGNIFICANT CHANGE UP (ref 5–8)
PLATELET # BLD AUTO: 207 K/UL — SIGNIFICANT CHANGE UP (ref 150–400)
POTASSIUM SERPL-MCNC: 3.6 MMOL/L — SIGNIFICANT CHANGE UP (ref 3.5–5.3)
POTASSIUM SERPL-SCNC: 3.6 MMOL/L — SIGNIFICANT CHANGE UP (ref 3.5–5.3)
PROT SERPL-MCNC: 8.8 GM/DL — HIGH (ref 6–8.3)
PROT UR-MCNC: 30 MG/DL
RBC # BLD: 4.07 M/UL — SIGNIFICANT CHANGE UP (ref 3.8–5.2)
RBC # FLD: 14.8 % — HIGH (ref 10.3–14.5)
SARS-COV-2 RNA SPEC QL NAA+PROBE: SIGNIFICANT CHANGE UP
SODIUM SERPL-SCNC: 135 MMOL/L — SIGNIFICANT CHANGE UP (ref 135–145)
SP GR SPEC: 1.01 — SIGNIFICANT CHANGE UP (ref 1.01–1.02)
TROPONIN I SERPL-MCNC: 0.06 NG/ML — HIGH (ref 0.01–0.04)
UROBILINOGEN FLD QL: 4 MG/DL
WBC # BLD: 6.7 K/UL — SIGNIFICANT CHANGE UP (ref 3.8–10.5)
WBC # FLD AUTO: 6.7 K/UL — SIGNIFICANT CHANGE UP (ref 3.8–10.5)

## 2020-06-05 PROCEDURE — 86850 RBC ANTIBODY SCREEN: CPT

## 2020-06-05 PROCEDURE — 86901 BLOOD TYPING SEROLOGIC RH(D): CPT

## 2020-06-05 PROCEDURE — 80048 BASIC METABOLIC PNL TOTAL CA: CPT

## 2020-06-05 PROCEDURE — 93458 L HRT ARTERY/VENTRICLE ANGIO: CPT

## 2020-06-05 PROCEDURE — 93010 ELECTROCARDIOGRAM REPORT: CPT | Mod: 76

## 2020-06-05 PROCEDURE — C1887: CPT

## 2020-06-05 PROCEDURE — C1894: CPT

## 2020-06-05 PROCEDURE — 93306 TTE W/DOPPLER COMPLETE: CPT

## 2020-06-05 PROCEDURE — U0003: CPT

## 2020-06-05 PROCEDURE — 36415 COLL VENOUS BLD VENIPUNCTURE: CPT

## 2020-06-05 PROCEDURE — 87635 SARS-COV-2 COVID-19 AMP PRB: CPT

## 2020-06-05 PROCEDURE — 71045 X-RAY EXAM CHEST 1 VIEW: CPT | Mod: 26

## 2020-06-05 PROCEDURE — 84484 ASSAY OF TROPONIN QUANT: CPT

## 2020-06-05 PROCEDURE — 74174 CTA ABD&PLVS W/CONTRAST: CPT | Mod: 26

## 2020-06-05 PROCEDURE — 86900 BLOOD TYPING SEROLOGIC ABO: CPT

## 2020-06-05 PROCEDURE — 93005 ELECTROCARDIOGRAM TRACING: CPT

## 2020-06-05 PROCEDURE — C1753: CPT

## 2020-06-05 PROCEDURE — 99223 1ST HOSP IP/OBS HIGH 75: CPT | Mod: AI

## 2020-06-05 PROCEDURE — 80061 LIPID PANEL: CPT

## 2020-06-05 PROCEDURE — 71275 CT ANGIOGRAPHY CHEST: CPT | Mod: 26

## 2020-06-05 PROCEDURE — C1769: CPT

## 2020-06-05 RX ORDER — SODIUM CHLORIDE 9 MG/ML
3 INJECTION INTRAMUSCULAR; INTRAVENOUS; SUBCUTANEOUS ONCE
Refills: 0 | Status: COMPLETED | OUTPATIENT
Start: 2020-06-05 | End: 2020-06-05

## 2020-06-05 RX ORDER — ATORVASTATIN CALCIUM 80 MG/1
40 TABLET, FILM COATED ORAL AT BEDTIME
Refills: 0 | Status: DISCONTINUED | OUTPATIENT
Start: 2020-06-05 | End: 2020-06-08

## 2020-06-05 RX ORDER — ACETAMINOPHEN 500 MG
1000 TABLET ORAL ONCE
Refills: 0 | Status: COMPLETED | OUTPATIENT
Start: 2020-06-05 | End: 2020-06-05

## 2020-06-05 RX ORDER — ASPIRIN/CALCIUM CARB/MAGNESIUM 324 MG
81 TABLET ORAL DAILY
Refills: 0 | Status: DISCONTINUED | OUTPATIENT
Start: 2020-06-05 | End: 2020-06-08

## 2020-06-05 RX ORDER — ACETAMINOPHEN 500 MG
650 TABLET ORAL EVERY 6 HOURS
Refills: 0 | Status: DISCONTINUED | OUTPATIENT
Start: 2020-06-05 | End: 2020-06-08

## 2020-06-05 RX ORDER — HYDROMORPHONE HYDROCHLORIDE 2 MG/ML
0.5 INJECTION INTRAMUSCULAR; INTRAVENOUS; SUBCUTANEOUS ONCE
Refills: 0 | Status: DISCONTINUED | OUTPATIENT
Start: 2020-06-05 | End: 2020-06-05

## 2020-06-05 RX ORDER — ONDANSETRON 8 MG/1
4 TABLET, FILM COATED ORAL EVERY 6 HOURS
Refills: 0 | Status: DISCONTINUED | OUTPATIENT
Start: 2020-06-05 | End: 2020-06-08

## 2020-06-05 RX ORDER — NITROGLYCERIN 6.5 MG
0.4 CAPSULE, EXTENDED RELEASE ORAL
Refills: 0 | Status: DISCONTINUED | OUTPATIENT
Start: 2020-06-05 | End: 2020-06-08

## 2020-06-05 RX ORDER — SODIUM CHLORIDE 9 MG/ML
1000 INJECTION INTRAMUSCULAR; INTRAVENOUS; SUBCUTANEOUS ONCE
Refills: 0 | Status: COMPLETED | OUTPATIENT
Start: 2020-06-05 | End: 2020-06-05

## 2020-06-05 RX ORDER — AMLODIPINE BESYLATE 2.5 MG/1
5 TABLET ORAL DAILY
Refills: 0 | Status: DISCONTINUED | OUTPATIENT
Start: 2020-06-05 | End: 2020-06-08

## 2020-06-05 RX ORDER — ENOXAPARIN SODIUM 100 MG/ML
40 INJECTION SUBCUTANEOUS DAILY
Refills: 0 | Status: DISCONTINUED | OUTPATIENT
Start: 2020-06-05 | End: 2020-06-08

## 2020-06-05 RX ORDER — ASPIRIN/CALCIUM CARB/MAGNESIUM 324 MG
325 TABLET ORAL ONCE
Refills: 0 | Status: COMPLETED | OUTPATIENT
Start: 2020-06-05 | End: 2020-06-05

## 2020-06-05 RX ORDER — NITROGLYCERIN 6.5 MG
1 CAPSULE, EXTENDED RELEASE ORAL ONCE
Refills: 0 | Status: COMPLETED | OUTPATIENT
Start: 2020-06-05 | End: 2020-06-05

## 2020-06-05 RX ADMIN — ENOXAPARIN SODIUM 40 MILLIGRAM(S): 100 INJECTION SUBCUTANEOUS at 17:32

## 2020-06-05 RX ADMIN — Medication 325 MILLIGRAM(S): at 11:14

## 2020-06-05 RX ADMIN — SODIUM CHLORIDE 3 MILLILITER(S): 9 INJECTION INTRAMUSCULAR; INTRAVENOUS; SUBCUTANEOUS at 11:15

## 2020-06-05 RX ADMIN — Medication 1 TABLET(S): at 17:33

## 2020-06-05 RX ADMIN — SODIUM CHLORIDE 1000 MILLILITER(S): 9 INJECTION INTRAMUSCULAR; INTRAVENOUS; SUBCUTANEOUS at 09:10

## 2020-06-05 RX ADMIN — Medication 0.4 MILLIGRAM(S): at 23:03

## 2020-06-05 RX ADMIN — Medication 0.4 MILLIGRAM(S): at 09:10

## 2020-06-05 RX ADMIN — Medication 650 MILLIGRAM(S): at 23:13

## 2020-06-05 RX ADMIN — AMLODIPINE BESYLATE 5 MILLIGRAM(S): 2.5 TABLET ORAL at 17:34

## 2020-06-05 RX ADMIN — Medication 1 INCH(S): at 22:59

## 2020-06-05 RX ADMIN — SODIUM CHLORIDE 2000 MILLILITER(S): 9 INJECTION INTRAMUSCULAR; INTRAVENOUS; SUBCUTANEOUS at 08:15

## 2020-06-05 RX ADMIN — Medication 1000 MILLIGRAM(S): at 08:27

## 2020-06-05 RX ADMIN — ATORVASTATIN CALCIUM 40 MILLIGRAM(S): 80 TABLET, FILM COATED ORAL at 20:29

## 2020-06-05 RX ADMIN — Medication 1 INCH(S): at 11:13

## 2020-06-05 NOTE — H&P ADULT - NSHPPHYSICALEXAM_GEN_ALL_CORE
Vital Signs Last 24 Hrs  T(C): 37.3 (05 Jun 2020 07:29), Max: 37.3 (05 Jun 2020 07:29)  T(F): 99.1 (05 Jun 2020 07:29), Max: 99.1 (05 Jun 2020 07:29)  HR: 69 (05 Jun 2020 11:16) (69 - 85)  BP: 173/91 (05 Jun 2020 11:16) (145/90 - 173/91)  BP(mean): 111 (05 Jun 2020 11:16) (111 - 111)  RR: 16 (05 Jun 2020 11:16) (16 - 16)  SpO2: 100% (05 Jun 2020 11:16) (100% - 100%)

## 2020-06-05 NOTE — H&P ADULT - HISTORY OF PRESENT ILLNESS
48yo/F with PMH HTN presented for evaluation of substernal chest pain. Patient states she woke up last night from epigastric/retrosternal chest pain, tightness, no radiation, no N/V, some diaphoresis. Pain lasted till she came in to ED, releaved by nitro. NO prior cardiac history. +significant family h/o cardiac conditions in all immediate family members

## 2020-06-05 NOTE — CHART NOTE - NSCHARTNOTEFT_GEN_A_CORE
Called by RN for patient with complaints of chest pain. Patient was given Nitroglycerin SL x 1 and tylenol for pain. No relief of pain with nitro or tylenol. Patient seen at bedside, in no apparent distress, breathing on room air, has no pain while lying still on back or sitting up, pain located to sternal region, exacerbated with movements and deep inspiration. Pain is ~ 7/10 on movement, 0 pain at rest. No radiation of pain and no other accompanying symptoms. Patient denies fever, chills, headaches, dizziness, shortness of breath, cough, palpitations, nausea, vomiting, diarrhea, abdominal pain, leg pain, leg edema, or weakness.    Vital Signs Last 24 Hrs  T(C): 36.9 (05 Jun 2020 20:23), Max: 37.3 (05 Jun 2020 07:29)  T(F): 98.4 (05 Jun 2020 20:23), Max: 99.1 (05 Jun 2020 07:29)  HR: 67 (05 Jun 2020 23:02) (65 - 85)  BP: 143/92 (05 Jun 2020 23:02) (135/81 - 174/100)  BP(mean): 114 (05 Jun 2020 17:31) (111 - 114)  RR: 17 (05 Jun 2020 18:16) (16 - 18)  SpO2: 97% (05 Jun 2020 20:23) (97% - 100%)    PHYSICAL EXAM:  GENERAL: NAD, lying in bed comfortably  CHEST/LUNG: Clear to auscultation bilaterally; No rales, rhonchi, wheezing  HEART: Regular rate and rhythm; No murmurs  ABDOMEN: Bowel sounds present; Soft, Nontender  EXTREMITIES:  2+ Peripheral Pulses, brisk capillary refill. No edema  NERVOUS SYSTEM:  Alert & Oriented X3, speech clear. No deficits     A/P  50 y/o female admitted for chest pain.   -Troponins 0.064 x 3  -Repeat Troponin pending  -EKG repeated and reviewed: NSR, unchanged from prior   -F/u repeat troponin level.       Above discussed with Dr. Garza PGY-3

## 2020-06-05 NOTE — ED PROVIDER NOTE - OBJECTIVE STATEMENT
50 yo pt presents to ED for abd pain.  Pain started last night at 8pm.  This am pt much more severe.  Pain comes and goes, nothing makes better or worse.  Points to epigastric area, sharp, 10/10.  no n/v/d, no sob, no fever. No travel, no sick contact.

## 2020-06-05 NOTE — ED PROVIDER NOTE - CONSTITUTIONAL, MLM
normal... Well appearing, awake, alert, oriented to person, place, time/situation and in severe distress due to pain

## 2020-06-05 NOTE — H&P ADULT - NSHPLABSRESULTS_GEN_ALL_CORE
12.8   6.70  )-----------( 207      ( 2020 07:43 )             38.9     2020 07:43    135    |  100    |  10     ----------------------------<  87     3.6     |  27     |  0.90     Ca    9.4        2020 07:43    TPro  8.8    /  Alb  4.5    /  TBili  1.1    /  DBili  x      /  AST  31     /  ALT  31     /  AlkPhos  104    2020 07:43    LIVER FUNCTIONS - ( 2020 07:43 )  Alb: 4.5 g/dL / Pro: 8.8 gm/dL / ALK PHOS: 104 U/L / ALT: 31 U/L / AST: 31 U/L / GGT: x           CARDIAC MARKERS ( 2020 07:43 )  0.064 ng/mL / x     / x     / x     / x        Urinalysis Basic - ( 2020 07:43 )  Color: Yellow / Appearance: Clear / S.010 / pH: x  Gluc: x / Ketone: Trace  / Bili: Negative / Urobili: 4 mg/dL   Blood: x / Protein: 30 mg/dL / Nitrite: Negative   Leuk Esterase: Negative / RBC: 3-5 /HPF / WBC 0-2   Sq Epi: x / Non Sq Epi: Few / Bacteria: Occasional

## 2020-06-05 NOTE — H&P ADULT - ASSESSMENT
#Chest pain  #Borderline troponin  Admit to telemetry  Cardio eval DR Dubose  Trend troponin  Aspirin, statin  ECHO  Lipid panel in AM  May need stress vs card cath    #HTN- cont Norvasc    #DVT proph- lovenox SQ    #Dispo- inpatient admit.

## 2020-06-05 NOTE — PATIENT PROFILE ADULT - DISASTER - INDICATE TYPE
Health Care Proxy (HCP)
Initiate Treatment: Ketoconazole foam: apply to the affects areas of the trunk Qd x 2 weeks until resolved. Then 2-3 weeks for maintenance.
Otc Regimen: Recommended CeraVe cream or Eucerin advanced repair cream.
Detail Level: Zone

## 2020-06-05 NOTE — H&P ADULT - NSICDXPASTSURGICALHX_GEN_ALL_CORE_FT
PAST SURGICAL HISTORY:  S/P foot surgery had surgery x2 to rt foot for abscess    S/P hysterectomy     S/P tubal ligation     Vaginal cyst

## 2020-06-05 NOTE — ED PROVIDER NOTE - PROGRESS NOTE DETAILS
Cam ORTIZ for ED attending Dr. Hemphill: Cardiology paged - Pt updated on the results of tests, much more comfortable after SL NTG. CP, ACS with elevated troponin, plan for hospital admission. Cam ORTIZ for ED attending Dr. Hemphill: Discussed with Dr. Dubose, cardiology will follow. Cam ORTIZ for ED attending Dr. Hemphill: Discussed with Dr. Esteban for admission.

## 2020-06-05 NOTE — ED ADULT NURSE NOTE - OBJECTIVE STATEMENT
c/o intermittent right upper abdominal pain started last night, denies nausea/vomiting/diarrhea/fever or acute injury  HX: HTN

## 2020-06-06 LAB
CHOLEST SERPL-MCNC: 193 MG/DL — SIGNIFICANT CHANGE UP (ref 10–199)
CULTURE RESULTS: SIGNIFICANT CHANGE UP
HDLC SERPL-MCNC: 125 MG/DL — SIGNIFICANT CHANGE UP
LIPID PNL WITH DIRECT LDL SERPL: 58 MG/DL — SIGNIFICANT CHANGE UP
SPECIMEN SOURCE: SIGNIFICANT CHANGE UP
TOTAL CHOLESTEROL/HDL RATIO MEASUREMENT: 1.5 RATIO — LOW (ref 3.3–7.1)
TRIGL SERPL-MCNC: 49 MG/DL — SIGNIFICANT CHANGE UP (ref 10–149)
TROPONIN I SERPL-MCNC: 0.06 NG/ML — HIGH (ref 0.01–0.04)
TROPONIN I SERPL-MCNC: 0.06 NG/ML — HIGH (ref 0.01–0.04)

## 2020-06-06 PROCEDURE — 93306 TTE W/DOPPLER COMPLETE: CPT | Mod: 26

## 2020-06-06 PROCEDURE — 99233 SBSQ HOSP IP/OBS HIGH 50: CPT | Mod: GC

## 2020-06-06 RX ORDER — PANTOPRAZOLE SODIUM 20 MG/1
40 TABLET, DELAYED RELEASE ORAL
Refills: 0 | Status: DISCONTINUED | OUTPATIENT
Start: 2020-06-06 | End: 2020-06-08

## 2020-06-06 RX ORDER — NICOTINE POLACRILEX 2 MG
1 GUM BUCCAL DAILY
Refills: 0 | Status: DISCONTINUED | OUTPATIENT
Start: 2020-06-06 | End: 2020-06-07

## 2020-06-06 RX ORDER — IBUPROFEN 200 MG
600 TABLET ORAL ONCE
Refills: 0 | Status: COMPLETED | OUTPATIENT
Start: 2020-06-06 | End: 2020-06-06

## 2020-06-06 RX ADMIN — Medication 1 PATCH: at 20:25

## 2020-06-06 RX ADMIN — ATORVASTATIN CALCIUM 40 MILLIGRAM(S): 80 TABLET, FILM COATED ORAL at 22:09

## 2020-06-06 RX ADMIN — PANTOPRAZOLE SODIUM 40 MILLIGRAM(S): 20 TABLET, DELAYED RELEASE ORAL at 05:24

## 2020-06-06 RX ADMIN — Medication 600 MILLIGRAM(S): at 05:23

## 2020-06-06 RX ADMIN — Medication 1 TABLET(S): at 09:26

## 2020-06-06 RX ADMIN — Medication 81 MILLIGRAM(S): at 09:26

## 2020-06-06 RX ADMIN — AMLODIPINE BESYLATE 5 MILLIGRAM(S): 2.5 TABLET ORAL at 05:24

## 2020-06-06 RX ADMIN — ENOXAPARIN SODIUM 40 MILLIGRAM(S): 100 INJECTION SUBCUTANEOUS at 09:26

## 2020-06-06 NOTE — PROGRESS NOTE ADULT - SUBJECTIVE AND OBJECTIVE BOX
CHIEF COMPLAINT:    SUBJECTIVE:     REVIEW OF SYSTEMS:  CONSTITUTIONAL: No weakness, fevers or chills  EYES/ENT: No visual changes;  No vertigo or throat pain   NECK: No pain or stiffness  RESPIRATORY: No cough, wheezing, hemoptysis; No shortness of breath  CARDIOVASCULAR: No chest pain or palpitations  GASTROINTESTINAL: No abdominal or epigastric pain. No nausea, vomiting, or hematemesis; No diarrhea or constipation. No melena or hematochezia.  GENITOURINARY: No dysuria, frequency or hematuria  NEUROLOGICAL: No numbness or weakness  SKIN: No itching, burning, rashes, or lesions   All other review of systems is negative unless indicated above    Vital Signs Last 24 Hrs  T(C): 37.1 (06 Jun 2020 09:29), Max: 37.1 (06 Jun 2020 09:29)  T(F): 98.7 (06 Jun 2020 09:29), Max: 98.7 (06 Jun 2020 09:29)  HR: 75 (06 Jun 2020 09:29) (67 - 75)  BP: 137/88 (06 Jun 2020 09:29) (135/81 - 154/102)  BP(mean): --  RR: 17 (06 Jun 2020 09:29) (17 - 17)  SpO2: 99% (06 Jun 2020 09:29) (97% - 99%)    I&O's Summary    06 Jun 2020 07:01  -  06 Jun 2020 18:29  --------------------------------------------------------  IN: 480 mL / OUT: 0 mL / NET: 480 mL        CAPILLARY BLOOD GLUCOSE          PHYSICAL EXAM:  Constitutional: NAD, awake and alert, well-developed  HEENT: PERR, EOMI, Normal Hearing, MMM  Neck: Soft and supple, No LAD, No JVD  Respiratory: Breath sounds are clear bilaterally, No wheezing, rales or rhonchi  Cardiovascular: S1 and S2, regular rate and rhythm, no Murmurs, gallops or rubs  Gastrointestinal: Bowel Sounds present, soft, nontender, nondistended, no guarding, no rebound  Extremities: No peripheral edema  Vascular: 2+ peripheral pulses  Neurological: A/O x 3, no focal deficits  Musculoskeletal: 5/5 strength b/l upper and lower extremities  Skin: No rashes    MEDICATIONS:  MEDICATIONS  (STANDING):  amLODIPine   Tablet 5 milliGRAM(s) Oral daily  aspirin enteric coated 81 milliGRAM(s) Oral daily  atorvastatin 40 milliGRAM(s) Oral at bedtime  enoxaparin Injectable 40 milliGRAM(s) SubCutaneous daily  multivitamin 1 Tablet(s) Oral daily  nicotine -   7 mG/24Hr(s) Patch 1 patch Transdermal daily  pantoprazole    Tablet 40 milliGRAM(s) Oral before breakfast      LABS: All Labs Reviewed:                        12.8   6.70  )-----------( 207      ( 05 Jun 2020 07:43 )             38.9     06-05    135  |  100  |  10  ----------------------------<  87  3.6   |  27  |  0.90    Ca    9.4      05 Jun 2020 07:43    TPro  8.8<H>  /  Alb  4.5  /  TBili  1.1  /  DBili  x   /  AST  31  /  ALT  31  /  AlkPhos  104  06-05      CARDIAC MARKERS ( 06 Jun 2020 07:49 )  0.063 ng/mL / x     / x     / x     / x      CARDIAC MARKERS ( 06 Jun 2020 00:45 )  0.062 ng/mL / x     / x     / x     / x      CARDIAC MARKERS ( 05 Jun 2020 15:38 )  0.064 ng/mL / x     / x     / x     / x      CARDIAC MARKERS ( 05 Jun 2020 07:43 )  0.064 ng/mL / x     / x     / x     / x          Blood Culture: 06-05 @ 07:43  Organism --  Gram Stain Blood -- Gram Stain --  Specimen Source .Urine Clean Catch (Midstream)  Culture-Blood --        RADIOLOGY/EKG:    DVT PPX:    ADVANCED DIRECTIVE:    DISPOSITION: HPI:  50 yo/F with PMH HTN, depression, current smoker, BIBEMS for evaluation of substernal chest pain. Patient states she woke up on night prior to ED presentation from epigastric/retrosternal chest pain, tightness, no radiation, no N/V, some diaphoresis. Pain persisted without relief, lasted till she came in to the ED, relieved by nitro. NO prior cardiac history. +significant family h/o cardiac conditions in all immediate family members (cardiac-related deaths)    SUBJECTIVE:   6/6: HPI confirmed with pt this AM. In addition pt reports smoking hx of 36yrs, reports 3-4 cigarettes per day, consumption of alcohol about every other day. Current denies any chest pain or SOB. Pt reports last chest pain was ~6AM which resolved with sublingual nitro.    REVIEW OF SYSTEMS:  CONSTITUTIONAL: No weakness, fevers or chills  EYES/ENT: No visual changes;  No vertigo or throat pain   NECK: No pain or stiffness  RESPIRATORY: No cough, wheezing, hemoptysis; resolved SOB  CARDIOVASCULAR: resolved chest pain, no palpitation  GASTROINTESTINAL: No abdominal or epigastric pain. No nausea, vomiting, or hematemesis; No diarrhea or constipation. No melena or hematochezia.  GENITOURINARY: No dysuria, frequency or hematuria  NEUROLOGICAL: No numbness or weakness  SKIN: No itching, burning, rashes, or lesions   All other review of systems is negative unless indicated above    Vital Signs Last 24 Hrs  T(C): 37.1 (06 Jun 2020 09:29), Max: 37.1 (06 Jun 2020 09:29)  T(F): 98.7 (06 Jun 2020 09:29), Max: 98.7 (06 Jun 2020 09:29)  HR: 75 (06 Jun 2020 09:29) (67 - 75)  BP: 137/88 (06 Jun 2020 09:29) (135/81 - 154/102)  RR: 17 (06 Jun 2020 09:29) (17 - 17)  SpO2: 99% (06 Jun 2020 09:29) (97% - 99%)    I&O's Summary    06 Jun 2020 07:01  -  06 Jun 2020 18:29  --------------------------------------------------------  IN: 480 mL / OUT: 0 mL / NET: 480 mL      PHYSICAL EXAM:  Constitutional: NAD, awake and alert, well-developed  HEENT: PERR, EOMI, Normal Hearing, MMM  Neck: Soft and supple, No LAD, No JVD  Respiratory: Breath sounds are clear bilaterally, No wheezing, rales or rhonchi  Cardiovascular: S1 and S2, regular rate and rhythm, no Murmurs, gallops or rubs  Gastrointestinal: Bowel Sounds present, soft, nontender, nondistended, no guarding, no rebound  Extremities: No peripheral edema  Vascular: 2+ peripheral pulses  Neurological: A/O x 3, no focal deficits  Musculoskeletal: 5/5 strength b/l upper and lower extremities  Skin: No rashes    MEDICATIONS:  MEDICATIONS  (STANDING):  amLODIPine   Tablet 5 milliGRAM(s) Oral daily  aspirin enteric coated 81 milliGRAM(s) Oral daily  atorvastatin 40 milliGRAM(s) Oral at bedtime  enoxaparin Injectable 40 milliGRAM(s) SubCutaneous daily  multivitamin 1 Tablet(s) Oral daily  nicotine -   7 mG/24Hr(s) Patch 1 patch Transdermal daily  pantoprazole    Tablet 40 milliGRAM(s) Oral before breakfast      LABS: All Labs Reviewed:                        12.8   6.70  )-----------( 207      ( 05 Jun 2020 07:43 )             38.9     06-05    135  |  100  |  10  ----------------------------<  87  3.6   |  27  |  0.90    Ca    9.4      05 Jun 2020 07:43    TPro  8.8<H>  /  Alb  4.5  /  TBili  1.1  /  DBili  x   /  AST  31  /  ALT  31  /  AlkPhos  104  06-05      CARDIAC MARKERS ( 06 Jun 2020 07:49 )  0.063 ng/mL / x     / x     / x     / x      CARDIAC MARKERS ( 06 Jun 2020 00:45 )  0.062 ng/mL / x     / x     / x     / x      CARDIAC MARKERS ( 05 Jun 2020 15:38 )  0.064 ng/mL / x     / x     / x     / x      CARDIAC MARKERS ( 05 Jun 2020 07:43 )  0.064 ng/mL / x     / x     / x     / x          Blood Culture: 06-05 @ 07:43  Organism --  Gram Stain Blood -- Gram Stain --  Specimen Source .Urine Clean Catch (Midstream)  Culture-Blood --      < from: CT Angio Chest Dissection Protocol (06.05.20 @ 08:43) >  INTERPRETATION:  CLINICAL INFORMATION: Severe lower chest pain. Evaluate for dissection.    COMPARISON: CT chest 7/8/2018 and CT abdomen/pelvis 4/5/2018    PROCEDURE:   CT Angiography of the Chest, Abdomen and Pelvis.   Precontrast imaging was performed through the chest followed by arterial phase imaging of the chest, abdomen and pelvis.  Intravenous contrast: 90 ml Omnipaque 350. 10 ml discarded.  Oral contrast: None.  Sagittal and coronal reformats were performed as well as 3D (MIP) reconstructions.    FINDINGS:  CHEST:   LUNGS AND LARGE AIRWAYS: Patent central airways. Few scattered cysts. Lungs otherwise clear.  PLEURA: No pleural effusion.  VESSELS: No thoracic or abdominal aortic dissection. Mild focal ectasia of the distal aortic arch measuring 2.7 cm, unchanged. No pulmonary embolism.  HEART: Heart size is normal. No pericardial effusion.  MEDIASTINUM AND SARAH: No lymphadenopathy.  CHEST WALL AND LOWER NECK: No enlarged axillary lymph nodes.    ABDOMEN AND PELVIS:  LIVER: Within normal limits.  BILE DUCTS: Mildly dilated pancreatic duct at the pancreatic body tail junction measuring 3-4 mm, not significantly changed since 7/8/2018.  GALLBLADDER: Within normal limits.  SPLEEN: Within normal limits.  PANCREAS: Within normal limits.  ADRENALS: Within normal limits.  KIDNEYS/URETERS: Within normal limits.    BLADDER:Within normal limits.  REPRODUCTIVE ORGANS: Hysterectomy. Prominent tissue in the right adnexa (series 2 image 197), likely the right ovary if the right ovary is still present, unchanged since 4/5/2018.    BOWEL: No bowel obstruction. Colonic diverticulosis without evidence of diverticulitis. Appendix is normal.  PERITONEUM: No ascites.  VESSELS: See above. Mild calcified plaque in the abdominal aorta. Celiac, superior mesenteric, and renal arteries are patent with no significant narrowing. Inferior mesenteric artery is patent. Iliac and imaged femoral arteries are patent with no significant narrowing.  RETROPERITONEUM/LYMPH NODES: No lymphadenopathy.    ABDOMINAL WALL: Unremarkable.  BONES: No aggressive osseous lesion.    IMPRESSION:   No aortic dissection.        < end of copied text >

## 2020-06-06 NOTE — CONSULT NOTE ADULT - SUBJECTIVE AND OBJECTIVE BOX
CHIEF COMPLAINT: Patient is a 49y old  Female who presents with a chief complaint of chest pain (05 Jun 2020 15:03)      HPI: HPI:  48yo/F with PMH HTN presented for evaluation of substernal chest pain. Patient states she woke up last night from epigastric/retrosternal chest pain, tightness, no radiation, no N/V, some diaphoresis. Pain lasted till she came in to ED, releaved by nitro. NO prior cardiac history. +significant family h/o cardiac conditions in all immediate family members (05 Jun 2020 15:03)      PMHx: PAST MEDICAL & SURGICAL HISTORY:  Depressed  Hypertension  S/P foot surgery: had surgery x2 to rt foot for abscess  Vaginal cyst  S/P hysterectomy  S/P tubal ligation        Soc Hx:     FAMILY HISTORY:  Family history of cardiac disorder: both parents and all siblings      Allergies: Allergies    No Known Allergies    Intolerances          REVIEW OF SYSTEMS:    As above  + chest pain no shortness of breath  No lightheadeness or syncope  No leg swelling  No palpitations  No claudication-like symptoms    Vital Signs Last 24 Hrs  T(C): 36.4 (06 Jun 2020 05:13), Max: 37.2 (05 Jun 2020 17:31)  T(F): 97.6 (06 Jun 2020 05:13), Max: 98.9 (05 Jun 2020 17:31)  HR: 71 (06 Jun 2020 05:13) (65 - 74)  BP: 135/89 (06 Jun 2020 05:13) (135/81 - 174/100)  BP(mean): 114 (05 Jun 2020 17:31) (111 - 114)  RR: 17 (05 Jun 2020 18:16) (16 - 18)  SpO2: 98% (06 Jun 2020 05:13) (97% - 100%)    I&O's Summary      CAPILLARY BLOOD GLUCOSE          PHYSICAL EXAM:   Patient in NAD  Neck: No JVD; Carotids:  2+ without bruits  Respiratory:  Clear to A&P  Cardiovascular: S1 and S2, regular rate and rhythm, no Murmurs, gallops or rubs  Gastrointestinal:  Soft, non-tender; BS positive  Extremities: No peripheral edema  Vascular: 2+ peripheral pulses  Neurological: A/O x 3, no focal deficits      MEDICATIONS:  MEDICATIONS  (STANDING):  amLODIPine   Tablet 5 milliGRAM(s) Oral daily  aspirin enteric coated 81 milliGRAM(s) Oral daily  atorvastatin 40 milliGRAM(s) Oral at bedtime  enoxaparin Injectable 40 milliGRAM(s) SubCutaneous daily  multivitamin 1 Tablet(s) Oral daily  pantoprazole    Tablet 40 milliGRAM(s) Oral before breakfast      LABS: All Labs Reviewed:  Blood Culture: Organism --  Gram Stain Blood -- Gram Stain --  Specimen Source .Urine Clean Catch (Midstream)  Culture-Blood --      BNP   CBC                         Hemoglobin: 12.8 g/dL (06-05 @ 07:43)              Hematocrit: 38.9 % (06-05 @ 07:43)              Mean Cell Volume: 95.6 fl (06-05 @ 07:43)              Platelet Count - Automated: 207 K/uL (06-05 @ 07:43)                            Cardiac markers             Troponin I, Serum: 0.063 ng/mL (06-06 @ 07:49)  Troponin I, Serum: 0.062 ng/mL (06-06 @ 00:45)  Troponin I, Serum: 0.064 ng/mL (06-05 @ 15:38)  Troponin I, Serum: 0.064 ng/mL (06-05 @ 07:43)                             Chems        Sodium, Serum: 135 mmol/L (06-05 @ 07:43)          Potassium, Serum: 3.6 mmol/L (06-05 @ 07:43)          Blood Urea Nitrogen, Serum: 10 mg/dL (06-05 @ 07:43)          Creatinine 0.90                  Protein Total, Serum: 8.8 gm/dL (06-05 @ 07:43)                  Calcium, Total Serum: 9.4 mg/dL (06-05 @ 07:43)                  Bilirubin Total, Serum: 1.1 mg/dL (06-05 @ 07:43)          Alanine Aminotransferase (ALT/SGPT): 31 U/L (06-05 @ 07:43)          Aspartate Aminotransferase (AST/SGOT): 31 U/L (06-05 @ 07:43)                            RADIOLOGY:    EKG:  NSR; early repolarization; QS in V1V2; no dynamic ST changes-multiple EKGs taken all unchanged    Telemetry:  Sinus    ECHO:

## 2020-06-06 NOTE — CONSULT NOTE ADULT - ASSESSMENT
49 year old woman with the above history admitted with CP.  No significant ischemic Ekg changes.  All troponins minimally elevated and flat.  Awaiting TTE.  Patient will require nuclear stress testing versus cardiac catheterization.

## 2020-06-07 LAB
ANION GAP SERPL CALC-SCNC: 5 MMOL/L — SIGNIFICANT CHANGE UP (ref 5–17)
BUN SERPL-MCNC: 14 MG/DL — SIGNIFICANT CHANGE UP (ref 7–23)
CALCIUM SERPL-MCNC: 8.9 MG/DL — SIGNIFICANT CHANGE UP (ref 8.5–10.1)
CHLORIDE SERPL-SCNC: 103 MMOL/L — SIGNIFICANT CHANGE UP (ref 96–108)
CO2 SERPL-SCNC: 28 MMOL/L — SIGNIFICANT CHANGE UP (ref 22–31)
CREAT SERPL-MCNC: 0.62 MG/DL — SIGNIFICANT CHANGE UP (ref 0.5–1.3)
GLUCOSE SERPL-MCNC: 86 MG/DL — SIGNIFICANT CHANGE UP (ref 70–99)
POTASSIUM SERPL-MCNC: 3.8 MMOL/L — SIGNIFICANT CHANGE UP (ref 3.5–5.3)
POTASSIUM SERPL-SCNC: 3.8 MMOL/L — SIGNIFICANT CHANGE UP (ref 3.5–5.3)
SARS-COV-2 RNA SPEC QL NAA+PROBE: SIGNIFICANT CHANGE UP
SODIUM SERPL-SCNC: 136 MMOL/L — SIGNIFICANT CHANGE UP (ref 135–145)

## 2020-06-07 PROCEDURE — 99233 SBSQ HOSP IP/OBS HIGH 50: CPT | Mod: GC

## 2020-06-07 RX ORDER — ALPRAZOLAM 0.25 MG
0.25 TABLET ORAL ONCE
Refills: 0 | Status: DISCONTINUED | OUTPATIENT
Start: 2020-06-07 | End: 2020-06-07

## 2020-06-07 RX ORDER — NICOTINE POLACRILEX 2 MG
1 GUM BUCCAL DAILY
Refills: 0 | Status: DISCONTINUED | OUTPATIENT
Start: 2020-06-07 | End: 2020-06-08

## 2020-06-07 RX ADMIN — PANTOPRAZOLE SODIUM 40 MILLIGRAM(S): 20 TABLET, DELAYED RELEASE ORAL at 06:14

## 2020-06-07 RX ADMIN — AMLODIPINE BESYLATE 5 MILLIGRAM(S): 2.5 TABLET ORAL at 10:32

## 2020-06-07 RX ADMIN — Medication 1 TABLET(S): at 10:34

## 2020-06-07 RX ADMIN — Medication 81 MILLIGRAM(S): at 10:34

## 2020-06-07 RX ADMIN — Medication 1 PATCH: at 14:15

## 2020-06-07 RX ADMIN — Medication 1 PATCH: at 14:23

## 2020-06-07 RX ADMIN — ENOXAPARIN SODIUM 40 MILLIGRAM(S): 100 INJECTION SUBCUTANEOUS at 10:34

## 2020-06-07 RX ADMIN — Medication 0.25 MILLIGRAM(S): at 21:53

## 2020-06-07 RX ADMIN — Medication 1 PATCH: at 07:30

## 2020-06-07 RX ADMIN — ATORVASTATIN CALCIUM 40 MILLIGRAM(S): 80 TABLET, FILM COATED ORAL at 21:56

## 2020-06-07 RX ADMIN — Medication 1 PATCH: at 19:00

## 2020-06-07 NOTE — PROGRESS NOTE ADULT - ATTENDING COMMENTS
SUBJECTIVE:  The patient is a 25-year-old white male is here primarily because of reflux.  He's tried Zantac and Tums over-the-counter to no avail.  He is getting ready to travel to [place] and he has questions about immunizations and medications.  No melena or hematochezia.    PAST MEDICAL HISTORY:  Reviewed.    REVIEW OF SYSTEMS:  Please see above; 14 point ROS otherwise negative.      OBJECTIVE: Vitals signs are reviewed and are stable.    HEENT: PERRLA.  []  Neck:  Supple.  []  Lungs:  Clear.    Heart:  Regular rate and rhythm.  []  Abdomen:   Soft, nontender.  []  Extremities:  No cyanosis, clubbing or edema.  []    ASSESSMENT:    [] GERD with esophagitis    PLAN:  [] He is referred to [facility] travel agency and or [employer] he will call his pediatrician's for previous records.  He denies a try over-the-counter PPIs either Nexium, Prevacid, or Prilosec.  He will take 2 pills of each daily.  He will let me know if this improves.  Consideration will be given to a scope if this does not help.    Much of this encounter note is an electronic transcription/translation of spoken language to printed text.  The electronic translation of spoken language may permit erroneous, or at times, nonsensical words or phrases to be inadvertently transcribed.  Although I have reviewed the note for such errors, some may still exist.  
Patient seen and examined with Family Medicine Residents Lucie Tapia and Juan Manuel David on the Family Medicine Teaching Service.  Agree with history, physical, labs and plan which were reviewed in detail after a face to face encounter with the patient.    Patient seen during the COVID-19 Global Pandemic.
Patient seen and examined with Family Medicine Residents Lucie Pope and Melba Brar on the Family Medicine Teaching Service.  Agree with history, physical, labs and plan which were reviewed in detail after a face to face encounter with the patient.    Patient seen during the COVID-19 Global Pandemic.

## 2020-06-07 NOTE — PROGRESS NOTE ADULT - SUBJECTIVE AND OBJECTIVE BOX
HPI:  48 yo/F with PMH HTN, depression, current smoker, BIBEMS for evaluation of substernal chest pain. Patient states she woke up on night prior to ED presentation from epigastric/retrosternal chest pain, tightness, no radiation, no N/V, some diaphoresis. Pain persisted without relief, lasted till she came in to the ED, relieved by nitro. NO prior cardiac history. +significant family h/o cardiac conditions in all immediate family members (cardiac-related deaths)    SUBJECTIVE:   6/7: Pt seen at bedside this AM, Patient denies any chest pain or SOB overnight. Patient reports feeling the urge to smoke a cigarette.     REVIEW OF SYSTEMS:  CONSTITUTIONAL: No weakness, fevers or chills  EYES/ENT: No visual changes;  No vertigo or throat pain   NECK: No pain or stiffness  RESPIRATORY: No cough, wheezing, hemoptysis; resolved SOB  CARDIOVASCULAR: resolved chest pain, no palpitation  GASTROINTESTINAL: No abdominal or epigastric pain. No nausea, vomiting, or hematemesis; No diarrhea or constipation. No melena or hematochezia.  GENITOURINARY: No dysuria, frequency or hematuria  NEUROLOGICAL: No numbness or weakness  SKIN: No itching, burning, rashes, or lesions   All other review of systems is negative unless indicated above      Vital Signs Last 24 Hrs  T(C): 36.8 (07 Jun 2020 08:20), Max: 36.9 (06 Jun 2020 20:09)  T(F): 98.2 (07 Jun 2020 08:20), Max: 98.5 (06 Jun 2020 20:09)  HR: 78 (07 Jun 2020 08:20) (76 - 78)  BP: 134/81 (07 Jun 2020 08:20) (134/81 - 140/84)  RR: 18 (07 Jun 2020 08:20) (18 - 18)  SpO2: 99% (07 Jun 2020 08:20) (99% - 100%)      PHYSICAL EXAM:  Constitutional: NAD, awake and alert, well-developed  HEENT: PERR, EOMI, Normal Hearing, MMM  Neck: Soft and supple, No LAD, No JVD  Respiratory: Breath sounds are clear bilaterally, No wheezing, rales or rhonchi  Cardiovascular: S1 and S2, regular rate and rhythm, no Murmurs, gallops or rubs  Gastrointestinal: Bowel Sounds present, soft, nontender, nondistended, no guarding, no rebound  Extremities: No peripheral edema  Vascular: 2+ peripheral pulses  Neurological: A/O x 3, no focal deficits  Musculoskeletal: 5/5 strength b/l upper and lower extremities  Skin: No rashes    MEDICATIONS:  MEDICATIONS  (STANDING):  amLODIPine   Tablet 5 milliGRAM(s) Oral daily  aspirin enteric coated 81 milliGRAM(s) Oral daily  atorvastatin 40 milliGRAM(s) Oral at bedtime  enoxaparin Injectable 40 milliGRAM(s) SubCutaneous daily  multivitamin 1 Tablet(s) Oral daily  nicotine -   7 mG/24Hr(s) Patch 1 patch Transdermal daily  pantoprazole    Tablet 40 milliGRAM(s) Oral before breakfast      LABS: All Labs Reviewed:        06-07    136  |  103  |  14  ----------------------------<  86  3.8   |  28  |  0.62    Ca    8.9      07 Jun 2020 07:00      CARDIAC MARKERS ( 06 Jun 2020 07:49 )  0.063 ng/mL / x     / x     / x     / x      CARDIAC MARKERS ( 06 Jun 2020 00:45 )  0.062 ng/mL / x     / x     / x     / x      CARDIAC MARKERS ( 05 Jun 2020 15:38 )  0.064 ng/mL / x     / x     / x     / x      CARDIAC MARKERS ( 05 Jun 2020 07:43 )  0.064 ng/mL / x     / x     / x     / x          Blood Culture: 06-05 @ 07:43  Organism --  Gram Stain Blood -- Gram Stain --  Specimen Source .Urine Clean Catch (Midstream)  Culture-Blood --      < from: CT Angio Chest Dissection Protocol (06.05.20 @ 08:43) >  INTERPRETATION:  CLINICAL INFORMATION: Severe lower chest pain. Evaluate for dissection.    COMPARISON: CT chest 7/8/2018 and CT abdomen/pelvis 4/5/2018    PROCEDURE:   CT Angiography of the Chest, Abdomen and Pelvis.   Precontrast imaging was performed through the chest followed by arterial phase imaging of the chest, abdomen and pelvis.  Intravenous contrast: 90 ml Omnipaque 350. 10 ml discarded.  Oral contrast: None.  Sagittal and coronal reformats were performed as well as 3D (MIP) reconstructions.    FINDINGS:  CHEST:   LUNGS AND LARGE AIRWAYS: Patent central airways. Few scattered cysts. Lungs otherwise clear.  PLEURA: No pleural effusion.  VESSELS: No thoracic or abdominal aortic dissection. Mild focal ectasia of the distal aortic arch measuring 2.7 cm, unchanged. No pulmonary embolism.  HEART: Heart size is normal. No pericardial effusion.  MEDIASTINUM AND SARAH: No lymphadenopathy.  CHEST WALL AND LOWER NECK: No enlarged axillary lymph nodes.    ABDOMEN AND PELVIS:  LIVER: Within normal limits.  BILE DUCTS: Mildly dilated pancreatic duct at the pancreatic body tail junction measuring 3-4 mm, not significantly changed since 7/8/2018.  GALLBLADDER: Within normal limits.  SPLEEN: Within normal limits.  PANCREAS: Within normal limits.  ADRENALS: Within normal limits.  KIDNEYS/URETERS: Within normal limits.    BLADDER:Within normal limits.  REPRODUCTIVE ORGANS: Hysterectomy. Prominent tissue in the right adnexa (series 2 image 197), likely the right ovary if the right ovary is still present, unchanged since 4/5/2018.    BOWEL: No bowel obstruction. Colonic diverticulosis without evidence of diverticulitis. Appendix is normal.  PERITONEUM: No ascites.  VESSELS: See above. Mild calcified plaque in the abdominal aorta. Celiac, superior mesenteric, and renal arteries are patent with no significant narrowing. Inferior mesenteric artery is patent. Iliac and imaged femoral arteries are patent with no significant narrowing.  RETROPERITONEUM/LYMPH NODES: No lymphadenopathy.    ABDOMINAL WALL: Unremarkable.  BONES: No aggressive osseous lesion.    IMPRESSION:   No aortic dissection.      TTE Echo Complete w/o Contrast w/ Doppler (06.06.20 @ 08:33) >  Impression     Summary     Mild fibrocalcific changes noted to the mitral valve leaflets with   preserved leaflet excursion.   Tracemitral regurgitation is present.   The aortic valve is trileaflet with thin pliable leaflets.   The tricuspid valve leaflets are thin and pliable; valve motion is normal.   Trace tricuspid valve regurgitation is present.   Mild concentric left ventricular hypertrophy is present.   Estimated left ventricular ejection fraction is 65-70 %.   Normal appearing right ventricle structure and function.

## 2020-06-07 NOTE — PROGRESS NOTE ADULT - ASSESSMENT
48 yo/F with PMH HTN, depression, current smoker presenting with non-radiating mid-substernal chest pressure pain with associated SOB, r/o ACS.    #Chest pain  -r/o ACS, given smoking hx and strong family h/o cardiac-related deaths at young ages  -Tele overnight---> NSR 60's-120's  - EKG on admission with NSR, no dynamic ST-T wave changes  - Troponin elevated but flat  - Lipid panel wnl  - Cardio consulted -Cardiac cath on Monday  - Continue on ASA, statin, nitroglycerin sublingual PRN x3 doses  - Echo EF 65%-70%  - NPO after midnight    #HTN  -cont Norvasc    #h/o Smoking  - Current smoker, ~36yr smoking hx  - Smoking cessation with benefits and risk of smoking advised and discussed with pt  - Nicotine patch     #DVT proph  -on lovenox SQ
48 yo/F with PMH HTN, depression, current smoker presenting with non-radiating mid-substernal chest pressure pain with associated SOB, r/o ACS.    #Chest pain  -r/o ACS, given smoking hx and strong family h/o cardiac-related deaths at young ages  - EKG on admission with NSR, no dynamic ST-T wave changes  - Troponin borderline elevated  - Lipid panel wnl  - s/p ASA 325mg x1 dose in the ED.  - Cardio consulted - possible nuclear stress test vs. cardiac cath on Monday  - Continue monitoring on tele  - Continue on ASA, statin, nitroglycerin sublingual PRN x3 doses  - f/u echo result    #HTN- cont Norvasc    #h/o Smoking  - Current smoker, ~36yr smoking hx  - Smoking cessation with benefits and risk of smoking advised and discussed with pt  - Nicotine patch started    #DVT proph- on lovenox SQ    #Dispo- currently requiring inpt stay for further cardiac workup

## 2020-06-08 ENCOUNTER — TRANSCRIPTION ENCOUNTER (OUTPATIENT)
Age: 50
End: 2020-06-08

## 2020-06-08 VITALS — OXYGEN SATURATION: 100 % | HEART RATE: 88 BPM | DIASTOLIC BLOOD PRESSURE: 92 MMHG | SYSTOLIC BLOOD PRESSURE: 129 MMHG

## 2020-06-08 PROCEDURE — 99239 HOSP IP/OBS DSCHRG MGMT >30: CPT | Mod: GC

## 2020-06-08 PROCEDURE — 92978 ENDOLUMINL IVUS OCT C 1ST: CPT | Mod: 26,LD

## 2020-06-08 PROCEDURE — 93454 CORONARY ARTERY ANGIO S&I: CPT | Mod: 26

## 2020-06-08 RX ORDER — ATORVASTATIN CALCIUM 80 MG/1
20 TABLET, FILM COATED ORAL AT BEDTIME
Refills: 0 | Status: DISCONTINUED | OUTPATIENT
Start: 2020-06-08 | End: 2020-06-08

## 2020-06-08 RX ORDER — NICOTINE POLACRILEX 2 MG
1 GUM BUCCAL
Qty: 1 | Refills: 0
Start: 2020-06-08 | End: 2020-06-22

## 2020-06-08 RX ORDER — ATORVASTATIN CALCIUM 80 MG/1
1 TABLET, FILM COATED ORAL
Qty: 30 | Refills: 0
Start: 2020-06-08 | End: 2020-07-07

## 2020-06-08 RX ORDER — FENTANYL CITRATE 50 UG/ML
25 INJECTION INTRAVENOUS ONCE
Refills: 0 | Status: DISCONTINUED | OUTPATIENT
Start: 2020-06-08 | End: 2020-06-08

## 2020-06-08 RX ORDER — SODIUM CHLORIDE 9 MG/ML
1000 INJECTION INTRAMUSCULAR; INTRAVENOUS; SUBCUTANEOUS
Refills: 0 | Status: DISCONTINUED | OUTPATIENT
Start: 2020-06-08 | End: 2020-06-08

## 2020-06-08 RX ORDER — ASPIRIN/CALCIUM CARB/MAGNESIUM 324 MG
1 TABLET ORAL
Qty: 30 | Refills: 0
Start: 2020-06-08 | End: 2020-07-07

## 2020-06-08 RX ADMIN — FENTANYL CITRATE 25 MICROGRAM(S): 50 INJECTION INTRAVENOUS at 10:55

## 2020-06-08 RX ADMIN — Medication 1 TABLET(S): at 13:19

## 2020-06-08 RX ADMIN — Medication 81 MILLIGRAM(S): at 13:19

## 2020-06-08 RX ADMIN — SODIUM CHLORIDE 100 MILLILITER(S): 9 INJECTION INTRAMUSCULAR; INTRAVENOUS; SUBCUTANEOUS at 10:13

## 2020-06-08 RX ADMIN — AMLODIPINE BESYLATE 5 MILLIGRAM(S): 2.5 TABLET ORAL at 13:19

## 2020-06-08 RX ADMIN — PANTOPRAZOLE SODIUM 40 MILLIGRAM(S): 20 TABLET, DELAYED RELEASE ORAL at 06:22

## 2020-06-08 RX ADMIN — Medication 1 PATCH: at 06:24

## 2020-06-08 RX ADMIN — Medication 1 PATCH: at 13:26

## 2020-06-08 RX ADMIN — Medication 1 PATCH: at 13:19

## 2020-06-08 NOTE — DISCHARGE NOTE NURSING/CASE MANAGEMENT/SOCIAL WORK - NSDCPEPTSTRK_GEN_ALL_CORE
Need for follow up after discharge/Prescribed medications/Risk factors for stroke/Stroke support groups for patients, families, and friends/Stroke warning signs and symptoms/Signs and symptoms of stroke/Call 911 for stroke/Stroke education booklet

## 2020-06-08 NOTE — PROGRESS NOTE ADULT - SUBJECTIVE AND OBJECTIVE BOX
HPI:  48yo/F with strong family history of cardiac disease and PMH HTN presented for evaluation of substernal chest pain. Patient states she woke up last night from epigastric/retrosternal chest pain, tightness, no radiation, no N/V, some diaphoresis. Pain lasted till she came in to ED, relieved by nitro.  In ED, Pt found to have NSTEMI (Troponin: 0.064)  Today Pt brought to cath lab for ischemic evaluation.     ASA; II  Cr: 0.62  GFR: 123  Bleeding risk: 1.0%     Now, pt is s/p LHC, s/p IVUS on proximal LAD (moderate disease)    ROS: denies chest pain/ pressure, SOB or palpitation     Vital Signs;  T(C): 36.9 (06-08-20 @ 07:47), Max: 37.4 (06-07-20 @ 19:46)  HR: 87 (06-08-20 @ 07:47) (80 - 93)  BP: 154/90 (06-08-20 @ 07:47) (117/92 - 154/90)  RR: 16 (06-08-20 @ 07:47) (16 - 21)  SpO2: 100% (06-08-20 @ 07:47) (91% - 100%)    Physical Exam   General: awake, no acute distress   HEENT: NCAT, neck supple   CV: RRR, normal S1S2, no murmur/ rub   Pulmonary: clear, no wheezing or rales   GI: +BS, soft, non-tender, non-distended   : voiding freely   Extremities: no edema, + pedal pulses   Skin: no rashes or lesion. Rt. femoral access site with 5 fr sheath (to be removed at 10:45 am); no hematoma or bleeding     Labs:  06-07    136  |  103  |  14  ----------------------------<  86  3.8   |  28  |  0.62    Ca    8.9      07 Jun 2020 07:00    Cath Report < from: Cardiac Cath Lab - Adult (06.08.20 @ 08:16) >   Cardiac Arteries and Lesion Findings    LMCA: Normal.    LAD: Abnormal.There is a 50% stenosis noted in the ostial portion of the  vessel.     Prox LAD: Ostial.50% stenosis.    LCx: Normal.    RCA: Normal.    VA  LV function assessed as:Normal.  Ejection Fraction  +----------------------------------------+---------------------------------+  !Method                                  !EF%                              !  +----------------------------------------+---------------------------------+  !Estimated              !65                               !  +----------------------------------------+---------------------------------+     Impression     Diagnostic Conclusions  Borderline significant ostial LAD stenosis   Normal LV systolic function  Recommendations  IVUS to evaluate the significance of the lesion-Dr Curtis  < end of copied text >      Medications:  acetaminophen   Tablet .. 650 milliGRAM(s) Oral every 6 hours PRN  aluminum hydroxide/magnesium hydroxide/simethicone Suspension 30 milliLiter(s) Oral every 4 hours PRN  amLODIPine   Tablet 5 milliGRAM(s) Oral daily  aspirin enteric coated 81 milliGRAM(s) Oral daily  atorvastatin 40 milliGRAM(s) Oral at bedtime  enoxaparin Injectable 40 milliGRAM(s) SubCutaneous daily  multivitamin 1 Tablet(s) Oral daily  nicotine -  14 mG/24Hr(s) Patch 1 patch Transdermal daily  nitroglycerin     SubLingual 0.4 milliGRAM(s) SubLingual every 5 minutes PRN  ondansetron Injectable 4 milliGRAM(s) IV Push every 6 hours PRN  pantoprazole    Tablet 40 milliGRAM(s) Oral before breakfast      # NSTEMI: s/p LHC, s/p IVUS, moderate proximal LAD   - return to her unit post recovery   - 5 fr sheath to be removed at 10:45 am   - IV hydration   - continue ASA 81 mg daily   - continue norvasc 5 mg daily   - start Lipitor 20 mg daily   - post procedure, outcome and follow up care reviewed with patient/MD  - possible discharge home if medically stable  - follow up with cardiologist in 1 week     Plan was discussed with Dr. Dubose, pt and cath RN HPI:  48yo/F with strong family history of cardiac disease and PMH HTN presented for evaluation of substernal chest pain. Patient states she woke up last night from epigastric/retrosternal chest pain, tightness, no radiation, no N/V, some diaphoresis. Pain lasted till she came in to ED, relieved by nitro.  In ED, Pt found to have NSTEMI (Troponin: 0.064)  Today Pt brought to cath lab for ischemic evaluation.     ASA; II  Cr: 0.62  GFR: 123  Bleeding risk: 1.0%     Now, pt is s/p LHC, s/p IVUS on proximal LAD (moderate disease)    ROS: denies chest pain/ pressure, SOB or palpitation     Vital Signs;  T(C): 36.9 (06-08-20 @ 07:47), Max: 37.4 (06-07-20 @ 19:46)  HR: 87 (06-08-20 @ 07:47) (80 - 93)  BP: 154/90 (06-08-20 @ 07:47) (117/92 - 154/90)  RR: 16 (06-08-20 @ 07:47) (16 - 21)  SpO2: 100% (06-08-20 @ 07:47) (91% - 100%)    Physical Exam   General: awake, no acute distress   HEENT: NCAT, neck supple   CV: RRR, normal S1S2, no murmur/ rub   Pulmonary: clear, no wheezing or rales   GI: +BS, soft, non-tender, non-distended   : voiding freely   Extremities: no edema, + pedal pulses   Skin: no rashes or lesion. Rt. femoral access site with 5 fr sheath (to be removed at 10:45 am); no hematoma or bleeding     Labs:  06-07    136  |  103  |  14  ----------------------------<  86  3.8   |  28  |  0.62    Ca    8.9      07 Jun 2020 07:00    Cath Report < from: Cardiac Cath Lab - Adult (06.08.20 @ 08:16) >   Cardiac Arteries and Lesion Findings    LMCA: Normal.    LAD: Abnormal.There is a 50% stenosis noted in the ostial portion of the  vessel.     Prox LAD: Ostial.50% stenosis.    LCx: Normal.    RCA: Normal.    VA  LV function assessed as:Normal.  Ejection Fraction  +----------------------------------------+---------------------------------+  !Method                                  !EF%                              !  +----------------------------------------+---------------------------------+  !Estimated              !65                               !  +----------------------------------------+---------------------------------+     Impression     Diagnostic Conclusions  Borderline significant ostial LAD stenosis   Normal LV systolic function  Recommendations  IVUS to evaluate the significance of the lesion-Dr Curtis  < end of copied text >      Medications:  acetaminophen   Tablet .. 650 milliGRAM(s) Oral every 6 hours PRN  aluminum hydroxide/magnesium hydroxide/simethicone Suspension 30 milliLiter(s) Oral every 4 hours PRN  amLODIPine   Tablet 5 milliGRAM(s) Oral daily  aspirin enteric coated 81 milliGRAM(s) Oral daily  atorvastatin 40 milliGRAM(s) Oral at bedtime  enoxaparin Injectable 40 milliGRAM(s) SubCutaneous daily  multivitamin 1 Tablet(s) Oral daily  nicotine -  14 mG/24Hr(s) Patch 1 patch Transdermal daily  nitroglycerin     SubLingual 0.4 milliGRAM(s) SubLingual every 5 minutes PRN  ondansetron Injectable 4 milliGRAM(s) IV Push every 6 hours PRN  pantoprazole    Tablet 40 milliGRAM(s) Oral before breakfast      # NSTEMI: s/p LHC, s/p IVUS, moderate proximal LAD   - return to her unit post recovery   - 5 fr sheath to be removed at 10:45 am   - IV hydration   - continue ASA 81 mg daily   - continue norvasc 5 mg daily   - start Lipitor 20 mg daily   - post procedure, outcome and follow up care reviewed with patient/MD  - possible discharge home if medically stable  - follow up with cardiologist in 1 week     Plan was discussed with Dr. Dubose, pt and cath RN     Addendum at 11:30am)  5fr Rt femoral artery sheath pulled and applied manual compression for 20 min, dry dressing in place: no hematoma or bleeding.   VSS, 2+ PT/DP pulses on Rt.   Post- procedure instruction given verbally and pt understood.

## 2020-06-08 NOTE — DISCHARGE NOTE PROVIDER - CARE PROVIDER_API CALL
Tom Dubose  CARDIOVASCULAR DISEASE  [address]  [place] [address]  Phone: [phone]  Fax: [phone]  Follow Up Time: 2 weeks Tom Dubose  CARDIOVASCULAR DISEASE  [address]  [place] [address]  Phone: [phone]  Fax: [phone]  Follow Up Time: 2 weeks    Peter Bah  INTERNAL MEDICINE  [address] [place] [id number]  Phone: [phone]  Fax: [phone]  Established Patient  Follow Up Time: 1 week

## 2020-06-08 NOTE — DISCHARGE NOTE PROVIDER - PROVIDER TOKENS
PROVIDER:[TOKEN:[[id number]],FOLLOWUP:[2 weeks]] PROVIDER:[TOKEN:[[id number]],FOLLOWUP:[2 weeks]],PROVIDER:[TOKEN:[[id number]],FOLLOWUP:[1 week],ESTABLISHEDPATIENT:[T]]

## 2020-06-08 NOTE — DISCHARGE NOTE PROVIDER - HOSPITAL COURSE
48yo/F with PMH HTN, current smoker(4 cigarettes daily) presented to  6/5 with substernal chest pain. In the ED patient's chest pain resolved with nitroglycerin x1. Patient was found to have elevated but flat serial troponins (.064--.064--.062--.063) and no ST changes on EKG consistent with NSTEMI. Follow up TTE revealed a mild concentric left ventricular hypertrophy  and with a EF of 65%-70%. Patient was evaluated by cardiology and underwent a heart cath on 6/8. The patient was found to have a 50% stenosis in the LAD during the heart cath procedure but no stent was placed at the time. Patient returned back to the floor without any complications. Patient was observed on the floor S/P heart cath and was discharged home. Patient was seen on the day of discharge(6/8). Patient reported feeling_______. Patient was told to follow up with the cardiologist in one week outpatient.                  TTE Echo Complete w/o Contrast w/ Doppler (06.06.20 @ 08:33) >        Summary         Mild fibrocalcific changes noted to the mitral valve leaflets with     preserved leaflet excursion.     Tracemitral regurgitation is present.     The aortic valve is trileaflet with thin pliable leaflets.     The tricuspid valve leaflets are thin and pliable; valve motion is normal.     Trace tricuspid valve regurgitation is present.     Mild concentric left ventricular hypertrophy is present.     Estimated left ventricular ejection fraction is 65-70 %.     Normal appearing right ventricle structure and function.                        - continue ASA 81 mg daily    - continue norvasc 5 mg daily     - start Lipitor 20 mg daily     - post procedure, outcome and follow up care reviewed with patient/MD    - possible discharge home if medically stable    - follow up with cardiologist in 1 week             LMCA: Normal.        LAD: Abnormal.There is a 50% stenosis noted in the ostial portion of the    vessel.         Prox LAD: Ostial.50% stenosis.        LCx: Normal.        RCA: Normal. 48yo/F with PMH HTN, current smoker(4 cigarettes daily) presented to  6/5 with substernal chest pain. In the ED patient's chest pain resolved with nitroglycerin x1. Patient was found to have elevated but flat serial troponins (.064--.064--.062--.063) and no ST changes on EKG consistent with NSTEMI. Follow up TTE revealed a mild concentric left ventricular hypertrophy  and with a EF of 65%-70%. Patient was evaluated by cardiology and underwent a heart cath on 6/8. The patient was found to have a 50% stenosis in the LAD during the heart cath procedure but no stent was placed at the time. Patient returned back to the floor without any complications. Patient was observed on the floor S/P heart cath and was discharged home. Patient was seen on the day of discharge(6/8). Patient reported feeling_______. Patient was told to follow up with the cardiologist in one week outpatient.  Patient will continue norvasc 5mg QD, ASA 81mg, and start Lipitor 20mg .                                 Vital Signs Last 24 Hrs    T(C): 36.9 (08 Jun 2020 07:47), Max: 37.4 (07 Jun 2020 19:46)    T(F): 98.5 (08 Jun 2020 07:47), Max: 99.3 (07 Jun 2020 19:46)    HR: 75 (08 Jun 2020 13:00) (70 - 93)    BP: 128/84 (08 Jun 2020 13:00) (117/80 - 154/90)    BP(mean): 95 (08 Jun 2020 13:00) (91 - 102)    RR: 16 (08 Jun 2020 12:00) (16 - 21)    SpO2: 100% (08 Jun 2020 12:42) (91% - 100%)                TTE Echo Complete w/o Contrast w/ Doppler (06.06.20 @ 08:33) >        Summary         Mild fibrocalcific changes noted to the mitral valve leaflets with     preserved leaflet excursion.     Tracemitral regurgitation is present.     The aortic valve is trileaflet with thin pliable leaflets.     The tricuspid valve leaflets are thin and pliable; valve motion is normal.     Trace tricuspid valve regurgitation is present.     Mild concentric left ventricular hypertrophy is present.     Estimated left ventricular ejection fraction is 65-70 %.     Normal appearing right ventricle structure and function.                            LMCA: Normal.        LAD: Abnormal.There is a 50% stenosis noted in the ostial portion of the    vessel.         Prox LAD: Ostial.50% stenosis.        LCx: Normal.        RCA: Normal. 50yo/F with PMH HTN, current smoker(4 cigarettes daily) presented to  6/5 with substernal chest pain. In the ED patient's chest pain resolved with nitroglycerin x1. Patient was found to have elevated but flat serial troponins (.064--.064--.062--.063) and no ST changes on EKG consistent with NSTEMI. Follow up TTE revealed a mild concentric left ventricular hypertrophy  and with a EF of 65%-70%. Patient was evaluated by cardiology and underwent a heart cath on 6/8. The patient was found to have a 50% stenosis in the LAD during the heart cath procedure but no stent was placed at the time. Patient returned back to the floor without any complications. Patient was observed on the floor S/P heart cath and was discharged home. Patient was seen on the day of discharge(6/8). Patient reported feeling_______. Patient was told to follow up with the cardiologist in one week outpatient.  Patient will continue norvasc 5mg QD, ASA 81mg, and start Lipitor 20mg qd.                     Vital Signs Last 24 Hrs    T(C): 36.9 (08 Jun 2020 07:47), Max: 37.4 (07 Jun 2020 19:46)    T(F): 98.5 (08 Jun 2020 07:47), Max: 99.3 (07 Jun 2020 19:46)    HR: 75 (08 Jun 2020 13:00) (70 - 93)    BP: 128/84 (08 Jun 2020 13:00) (117/80 - 154/90)    BP(mean): 95 (08 Jun 2020 13:00) (91 - 102)    RR: 16 (08 Jun 2020 12:00) (16 - 21)    SpO2: 100% (08 Jun 2020 12:42) (91% - 100%)        PHYSICAL EXAM:    Constitutional: NAD, awake and alert, well-developed    HEENT: PERR, EOMI, Normal Hearing, MMM    Neck: Soft and supple, No LAD, No JVD    Respiratory: Breath sounds are clear bilaterally, No wheezing, rales or rhonchi    Cardiovascular: S1 and S2, regular rate and rhythm, no Murmurs, gallops or rubs    Gastrointestinal: Bowel Sounds present, soft, nontender, nondistended, no guarding, no rebound    Extremities: No peripheral edema    Vascular: 2+ peripheral pulses    Neurological: A/O x 3, no focal deficits    Musculoskeletal: 5/5 strength b/l upper and lower extremities    Skin: No rashes. Rt femoral access site clean. No hematoma or bleeding.                TTE Echo Complete w/o Contrast w/ Doppler (06.06.20 @ 08:33) >        Summary         Mild fibrocalcific changes noted to the mitral valve leaflets with     preserved leaflet excursion.     Tracemitral regurgitation is present.     The aortic valve is trileaflet with thin pliable leaflets.     The tricuspid valve leaflets are thin and pliable; valve motion is normal.     Trace tricuspid valve regurgitation is present.     Mild concentric left ventricular hypertrophy is present.     Estimated left ventricular ejection fraction is 65-70 %.     Normal appearing right ventricle structure and function.                Cath Report < from: Cardiac Cath Lab - Adult (06.08.20 @ 08:16) >     Cardiac Arteries and Lesion Findings        LMCA: Normal.        LAD: Abnormal.There is a 50% stenosis noted in the ostial portion of the    vessel.         Prox LAD: Ostial.50% stenosis.        LCx: Normal.        RCA: Normal. 50yo/F with PMH HTN, nicotine dependance, current smoker(4 cigarettes daily) presented to  6/5 with substernal chest pain. In the ED patient's chest pain resolved with nitroglycerin x1. Patient was found to have elevated but flat serial troponins (.064--.064--.062--.063) and no ST changes on EKG consistent with NSTEMI. Follow up TTE revealed a mild concentric left ventricular hypertrophy  and with a EF of 65%-70%. Patient was evaluated by cardiology and underwent a heart cath on 6/8. The patient was found to have a 50% stenosis in the LAD during the heart cath procedure but no stent was placed at the time. Patient returned back to the floor without any complications. Patient was observed on the floor S/P heart cath and was discharged home. Patient was seen on the day of discharge(6/8). Patient reported feeling well after the procedure. Patient report some point tenderness along her insertion site in her right groin. Patient was told to follow up with the cardiologist in one week outpatient.  Patient will continue norvasc 5mg QD, ASA 81mg, and start Lipitor 20mg qd.             Vital Signs Last 24 Hrs    T(C): 36.9 (08 Jun 2020 07:47), Max: 37.4 (07 Jun 2020 19:46)    T(F): 98.5 (08 Jun 2020 07:47), Max: 99.3 (07 Jun 2020 19:46)    HR: 75 (08 Jun 2020 13:00) (70 - 93)    BP: 128/84 (08 Jun 2020 13:00) (117/80 - 154/90)    BP(mean): 95 (08 Jun 2020 13:00) (91 - 102)    RR: 16 (08 Jun 2020 12:00) (16 - 21)    SpO2: 100% (08 Jun 2020 12:42) (91% - 100%)         PHYSICAL EXAM:    Constitutional: NAD, awake and alert, well-developed    HEENT: PERR, EOMI, Normal Hearing, MMM    Neck: Soft and supple, No LAD, No JVD    Respiratory: Breath sounds are clear bilaterally, No wheezing, rales or rhonchi    Cardiovascular: S1 and S2, regular rate and rhythm, no Murmurs, gallops or rubs    Gastrointestinal: Bowel Sounds present, soft, nontender, nondistended, no guarding, no rebound    Extremities: No peripheral edema    Vascular: 2+ peripheral pulses    Neurological: A/O x 3, no focal deficits    Musculoskeletal: 5/5 strength b/l upper and lower extremities    Skin: No rashes. Rt femoral access site clean. No hematoma or bleeding.                TTE Echo Complete w/o Contrast w/ Doppler (06.06.20 @ 08:33) >        Summary         Mild fibrocalcific changes noted to the mitral valve leaflets with     preserved leaflet excursion.     Tracemitral regurgitation is present.     The aortic valve is trileaflet with thin pliable leaflets.     The tricuspid valve leaflets are thin and pliable; valve motion is normal.     Trace tricuspid valve regurgitation is present.     Mild concentric left ventricular hypertrophy is present.     Estimated left ventricular ejection fraction is 65-70 %.     Normal appearing right ventricle structure and function.                Cath Report < from: Cardiac Cath Lab - Adult (06.08.20 @ 08:16) >     Cardiac Arteries and Lesion Findings        LMCA: Normal.        LAD: Abnormal.There is a 50% stenosis noted in the ostial portion of the    vessel.         Prox LAD: Ostial.50% stenosis.        LCx: Normal.        RCA: Normal.

## 2020-06-08 NOTE — SBIRT NOTE ADULT - NSSBIRTDRGPOSREINDET_GEN_A_CORE
Provided positive reinforcement of importance of taking drugs only for prescribed medical reasons. Pt verbalizes understanding.

## 2020-06-08 NOTE — SBIRT NOTE ADULT - NSSBIRTBRIEFINTDET_GEN_A_CORE
Reviewed with pt risk-modifying behavior; pt adamantly declines any ETOH direct referrals for inpt or outpt rehab. Reviewed with pt potential neg consequences of cont'd ETOH consumption. pt verbalizes understanding however states she is able to stop drinking on own, Provided  Addiction Services Folder for self-referral if needed

## 2020-06-08 NOTE — DISCHARGE NOTE PROVIDER - NSDCCPCAREPLAN_GEN_ALL_CORE_FT
PRINCIPAL DISCHARGE DIAGNOSIS  Diagnosis: Chest pain, unspecified type  Assessment and Plan of Treatment: You were found to have a NSTEMI on admission. Cardiac work up revealed you had 50% stenosis in one of you coronary arteries. No surgical intervention was needed at this time. You will take atorvastatin 40mg once a day, aspirin 81mg once a day. Please refrain from smoking, as this will add to your risk of suffering from an myocardial infarction(heart attack). Please folllow up with your PCP for alternative solutions to reduce your nicotine dependance. You will follow up with Dr. Dubose outpatient for continued evaluation and treatment.      SECONDARY DISCHARGE DIAGNOSES  Diagnosis: HTN (hypertension)  Assessment and Plan of Treatment: Continue taking your norvasc as prescribed.

## 2020-06-08 NOTE — DISCHARGE NOTE NURSING/CASE MANAGEMENT/SOCIAL WORK - PATIENT PORTAL LINK FT
You can access the FollowMyHealth Patient Portal offered by [facility] by registering at the following website: [email or web address]. By joining [facility]’s FollowMyHealth portal, you will also be able to view your health information using other applications (apps) compatible with our system.

## 2020-06-08 NOTE — DISCHARGE NOTE PROVIDER - NSDCMRMEDTOKEN_GEN_ALL_CORE_FT
amLODIPine 5 mg oral tablet: 1 tab(s) orally once a day amLODIPine 5 mg oral tablet: 1 tab(s) orally once a day   aspirin 81 mg oral delayed release tablet: 1 tab(s) orally once a day  atorvastatin 20 mg oral tablet: 1 tab(s) orally once a day (at bedtime)  nicotine 14 mg/24 hr transdermal film, extended release: 1 mg/24h transdermal once a day

## 2020-06-08 NOTE — DISCHARGE NOTE NURSING/CASE MANAGEMENT/SOCIAL WORK - NSDCPNDISPN_GEN_ALL_CORE
Side effects of pain management treatment/Activities of daily living, including home environment that might     exacerbate pain or reduce effectiveness of the pain management plan of care as well as strategies to address these issues/Education provided on the pain management plan of care/Safe use, storage and disposal of opioids when prescribed/Opioids not applicable/not prescribed

## 2020-06-10 DIAGNOSIS — I25.10 ATHEROSCLEROTIC HEART DISEASE OF NATIVE CORONARY ARTERY WITHOUT ANGINA PECTORIS: ICD-10-CM

## 2020-06-10 DIAGNOSIS — Z90.711 ACQUIRED ABSENCE OF UTERUS WITH REMAINING CERVICAL STUMP: ICD-10-CM

## 2020-06-10 DIAGNOSIS — I10 ESSENTIAL (PRIMARY) HYPERTENSION: ICD-10-CM

## 2020-06-10 DIAGNOSIS — I21.4 NON-ST ELEVATION (NSTEMI) MYOCARDIAL INFARCTION: ICD-10-CM

## 2020-06-10 DIAGNOSIS — I07.1 RHEUMATIC TRICUSPID INSUFFICIENCY: ICD-10-CM

## 2020-06-10 DIAGNOSIS — F17.210 NICOTINE DEPENDENCE, CIGARETTES, UNCOMPLICATED: ICD-10-CM

## 2020-07-02 ENCOUNTER — APPOINTMENT (OUTPATIENT)
Dept: FAMILY MEDICINE | Facility: CLINIC | Age: 50
End: 2020-07-02
Payer: COMMERCIAL

## 2020-07-02 VITALS
BODY MASS INDEX: 19.08 KG/M2 | HEART RATE: 97 BPM | TEMPERATURE: 98.1 F | HEIGHT: 67.5 IN | DIASTOLIC BLOOD PRESSURE: 76 MMHG | WEIGHT: 123 LBS | SYSTOLIC BLOOD PRESSURE: 108 MMHG | OXYGEN SATURATION: 99 %

## 2020-07-02 DIAGNOSIS — Z71.6 TOBACCO ABUSE COUNSELING: ICD-10-CM

## 2020-07-02 DIAGNOSIS — Z83.3 FAMILY HISTORY OF DIABETES MELLITUS: ICD-10-CM

## 2020-07-02 DIAGNOSIS — Z78.9 OTHER SPECIFIED HEALTH STATUS: ICD-10-CM

## 2020-07-02 DIAGNOSIS — F17.210 NICOTINE DEPENDENCE, CIGARETTES, UNCOMPLICATED: ICD-10-CM

## 2020-07-02 DIAGNOSIS — I25.2 OLD MYOCARDIAL INFARCTION: ICD-10-CM

## 2020-07-02 DIAGNOSIS — Z86.2 PERSONAL HISTORY OF DISEASES OF THE BLOOD AND BLOOD-FORMING ORGANS AND CERTAIN DISORDERS INVOLVING THE IMMUNE MECHANISM: ICD-10-CM

## 2020-07-02 DIAGNOSIS — I10 ESSENTIAL (PRIMARY) HYPERTENSION: ICD-10-CM

## 2020-07-02 DIAGNOSIS — F17.200 NICOTINE DEPENDENCE, UNSPECIFIED, UNCOMPLICATED: ICD-10-CM

## 2020-07-02 DIAGNOSIS — F10.21 ALCOHOL DEPENDENCE, IN REMISSION: ICD-10-CM

## 2020-07-02 DIAGNOSIS — Z82.49 FAMILY HISTORY OF ISCHEMIC HEART DISEASE AND OTHER DISEASES OF THE CIRCULATORY SYSTEM: ICD-10-CM

## 2020-07-02 PROCEDURE — 99203 OFFICE O/P NEW LOW 30 MIN: CPT

## 2020-07-02 RX ORDER — ASPIRIN 81 MG/1
81 TABLET, CHEWABLE ORAL
Refills: 0 | Status: ACTIVE | COMMUNITY

## 2020-07-02 RX ORDER — NICOTINE 21-14-7MG
21-14-7 KIT TRANSDERMAL
Refills: 0 | Status: ACTIVE | COMMUNITY

## 2020-07-02 RX ORDER — AMLODIPINE BESYLATE 10 MG/1
10 TABLET ORAL
Refills: 0 | Status: ACTIVE | COMMUNITY

## 2020-07-02 RX ORDER — NICOTINE 14 MG/24H
14 PATCH, EXTENDED RELEASE TRANSDERMAL DAILY
Qty: 2 | Refills: 0 | Status: ACTIVE | COMMUNITY
Start: 2020-07-02 | End: 1900-01-01

## 2020-07-02 RX ORDER — ATORVASTATIN CALCIUM 20 MG/1
20 TABLET, FILM COATED ORAL
Refills: 0 | Status: ACTIVE | COMMUNITY

## 2020-07-02 NOTE — HISTORY OF PRESENT ILLNESS
[FreeTextEntry1] : Pt. is NPA here to establish care.\par Pt. is following up from a heart Catherization on June 8th 2020 at [facility].  [de-identified] : Patient has a hx of HTN, which was uncontrolled in the past, had NSTEMI, likely due to uncontrolled HTN. Cardiac Cath showed 50% LAD stenosis, no stent placed. Patient is trying to quit smoking, was started on the patch and she would like to continue it.

## 2020-07-02 NOTE — PHYSICAL EXAM
[No Acute Distress] : no acute distress [Well Nourished] : well nourished [Well Developed] : well developed [Well-Appearing] : well-appearing [Normal Sclera/Conjunctiva] : normal sclera/conjunctiva [EOMI] : extraocular movements intact [Normal Outer Ear/Nose] : the outer ears and nose were normal in appearance [Supple] : supple [No Respiratory Distress] : no respiratory distress  [No Accessory Muscle Use] : no accessory muscle use [Clear to Auscultation] : lungs were clear to auscultation bilaterally [Normal Rate] : normal rate  [Regular Rhythm] : with a regular rhythm [Normal S1, S2] : normal S1 and S2 [No Murmur] : no murmur heard [Soft] : abdomen soft [Non Tender] : non-tender [Non-distended] : non-distended [No Masses] : no abdominal mass palpated [No HSM] : no HSM [Normal Bowel Sounds] : normal bowel sounds [No CVA Tenderness] : no CVA  tenderness [No Spinal Tenderness] : no spinal tenderness [No Joint Swelling] : no joint swelling [Grossly Normal Strength/Tone] : grossly normal strength/tone [Coordination Grossly Intact] : coordination grossly intact [No Focal Deficits] : no focal deficits [Normal Gait] : normal gait [Normal Affect] : the affect was normal [Normal Insight/Judgement] : insight and judgment were intact

## 2020-08-06 ENCOUNTER — APPOINTMENT (OUTPATIENT)
Dept: FAMILY MEDICINE | Facility: CLINIC | Age: 50
End: 2020-08-06

## 2020-08-26 ENCOUNTER — APPOINTMENT (OUTPATIENT)
Dept: FAMILY MEDICINE | Facility: CLINIC | Age: 50
End: 2020-08-26

## 2021-10-06 PROBLEM — I10 ESSENTIAL HYPERTENSION: Status: ACTIVE | Noted: 2020-07-02

## 2021-12-15 NOTE — ED ADULT NURSE NOTE - NSFALLRSKASSESSTYPE_ED_ALL_ED
I called [facility] to verify she was on schedule for tomorrow. I will call [facility] on Friday to set up her next lab draw.     Initial (On Arrival)

## 2022-04-11 NOTE — ED PROVIDER NOTE - ABNORMAL RHYTHM
Quality 130: Documentation Of Current Medications In The Medical Record: Current Medications Documented sinus tachycardia

## 2022-10-01 ENCOUNTER — EMERGENCY (EMERGENCY)
Facility: HOSPITAL | Age: 52
LOS: 1 days | Discharge: HOME | End: 2022-10-01
Attending: STUDENT IN AN ORGANIZED HEALTH CARE EDUCATION/TRAINING PROGRAM
Payer: COMMERCIAL

## 2022-10-01 VITALS
DIASTOLIC BLOOD PRESSURE: 90 MMHG | SYSTOLIC BLOOD PRESSURE: 122 MMHG | HEIGHT: 64 IN | TEMPERATURE: 98 F | HEART RATE: 102 BPM | RESPIRATION RATE: 20 BRPM | OXYGEN SATURATION: 99 %

## 2022-10-01 DIAGNOSIS — Z90.710 ACQUIRED ABSENCE OF BOTH CERVIX AND UTERUS: Chronic | ICD-10-CM

## 2022-10-01 DIAGNOSIS — Z98.890 OTHER SPECIFIED POSTPROCEDURAL STATES: Chronic | ICD-10-CM

## 2022-10-01 DIAGNOSIS — Z98.51 TUBAL LIGATION STATUS: Chronic | ICD-10-CM

## 2022-10-01 DIAGNOSIS — N89.8 OTHER SPECIFIED NONINFLAMMATORY DISORDERS OF VAGINA: Chronic | ICD-10-CM

## 2022-10-01 PROCEDURE — 99283 EMERGENCY DEPT VISIT LOW MDM: CPT

## 2022-10-01 PROCEDURE — 96372 THER/PROPH/DIAG INJ SC/IM: CPT

## 2022-10-01 PROCEDURE — 99284 EMERGENCY DEPT VISIT MOD MDM: CPT

## 2022-10-01 PROCEDURE — 73502 X-RAY EXAM HIP UNI 2-3 VIEWS: CPT

## 2022-10-01 RX ORDER — CYCLOBENZAPRINE HYDROCHLORIDE 10 MG/1
10 TABLET, FILM COATED ORAL ONCE
Refills: 0 | Status: COMPLETED | OUTPATIENT
Start: 2022-10-01 | End: 2022-10-01

## 2022-10-01 RX ORDER — OXYCODONE AND ACETAMINOPHEN 5; 325 MG/1; MG/1
1 TABLET ORAL ONCE
Refills: 0 | Status: DISCONTINUED | OUTPATIENT
Start: 2022-10-01 | End: 2022-10-01

## 2022-10-01 RX ORDER — KETOROLAC TROMETHAMINE 30 MG/ML
30 SYRINGE (ML) INJECTION ONCE
Refills: 0 | Status: DISCONTINUED | OUTPATIENT
Start: 2022-10-01 | End: 2022-10-01

## 2022-10-01 RX ADMIN — Medication 30 MILLIGRAM(S): at 22:41

## 2022-10-01 RX ADMIN — CYCLOBENZAPRINE HYDROCHLORIDE 10 MILLIGRAM(S): 10 TABLET, FILM COATED ORAL at 22:38

## 2022-10-01 RX ADMIN — OXYCODONE AND ACETAMINOPHEN 1 TABLET(S): 5; 325 TABLET ORAL at 22:39

## 2022-10-01 NOTE — ED PROVIDER NOTE - CARE PROVIDER_API CALL
Washington Dan (DO)  Orthopaedic Surgery  [address] [place] [address]  Phone: [phone]  Fax: [phone]  Follow Up Time:

## 2022-10-01 NOTE — ED PROVIDER NOTE - PATIENT PORTAL LINK FT
You can access the FollowMyHealth Patient Portal offered by [facility] by registering at the following website: [email or web address]. By joining [facility]’s FollowMyHealth portal, you will also be able to view your health information using other applications (apps) compatible with our system.

## 2022-10-01 NOTE — ED ADULT TRIAGE NOTE - CHIEF COMPLAINT QUOTE
Pt reporting severe L hip pain radiating down the leg since about 3 pm this afternoon. Pt reporting inability to walk r/t pain. Pt denies trauma/injury to the area. Pt states 'I was fine earlier, I was playing with my grandson, then I went to lay down and when I tried to get up I was in so much pain'. PMH Myocardial infarction 3 years ago, HTN. Pt appears uncomfortable r/t pain, tearful in triage.

## 2022-10-01 NOTE — ED PROVIDER NOTE - PROGRESS NOTE DETAILS
PT evaluated by intake physician. HPI/PE/ROS as noted above. Will follow up plan per intake physician. Pt states she had a hysterectomy. Faye reviewed and no acute findings. Will dc with f/u and pain meds.

## 2022-10-01 NOTE — ED PROVIDER NOTE - ATTENDING APP SHARED VISIT CONTRIBUTION OF CARE
I, Cal Toribio, performed the initial face to face bedside interview with this patient regarding history of present illness, review of symptoms and relevant past medical, social and family history.  I completed an independent physical examination.  I was the initial provider who evaluated this patient. I have signed out the follow up of any pending tests (i.e. labs, radiological studies) to the ACP.  I have communicated the patient’s plan of care and disposition with the ACP.

## 2022-10-01 NOTE — ED PROVIDER NOTE - OBJECTIVE STATEMENT
52 year old female with PMH HTn and CAD presents with hip pain. Pt states that she was babysitting her grandkids today, playing with them and being active, no trauma and able to walk, she laid down on the couch and when she woke up had a spasm pain in her L hip. She feels her hip to be stiff and has difficulty and pain with ROM. No numbness, tingling, weakness

## 2022-10-02 PROCEDURE — 73502 X-RAY EXAM HIP UNI 2-3 VIEWS: CPT | Mod: 26,LT

## 2022-10-02 RX ORDER — IBUPROFEN 200 MG
1 TABLET ORAL
Qty: 28 | Refills: 0
Start: 2022-10-02 | End: 2022-10-08

## 2022-10-02 RX ORDER — OXYCODONE AND ACETAMINOPHEN 5; 325 MG/1; MG/1
1 TABLET ORAL
Qty: 6 | Refills: 0
Start: 2022-10-02 | End: 2022-10-03

## 2022-10-02 RX ORDER — LIDOCAINE 4 G/100G
1 CREAM TOPICAL
Qty: 7 | Refills: 0
Start: 2022-10-02 | End: 2022-10-08

## 2022-10-02 RX ORDER — CYCLOBENZAPRINE HYDROCHLORIDE 10 MG/1
1 TABLET, FILM COATED ORAL
Qty: 12 | Refills: 0
Start: 2022-10-02 | End: 2022-10-05

## 2022-11-03 NOTE — DISCHARGE NOTE NURSING/CASE MANAGEMENT/SOCIAL WORK - NSDCPEFALRISK_GEN_ALL_CORE
Caity is here today for   Chief Complaint   Patient presents with   • Medicare Wellness Visit   .        Medication Refills needed today?  NO,   if you receive a prescription today would you like it to be sent to [facility]? NO    Medications: medications verified and updated    Patient would like communication of their results via:      Cell Phone:   Telephone Information:   Mobile [phone]     Okay to leave a message containing results? Yes    Tobacco history: verified       Health Maintenance Summary     Hepatitis B Vaccine (1 of 3 - 3-dose series)  Overdue - never done    Colorectal Cancer Screen- (Colonoscopy - Every 10 Years)  Ordered on 10/4/2022    DM/CKD Microalbumin (Yearly)  Overdue since 7/28/2018    Breast Cancer Screening (Yearly)  Ordered on 12/20/2021    COVID-19 Vaccine (3 - Booster for Teja series)  Overdue since 2/14/2022    Shingles Vaccine (1 of 2)  Postponed until 10/10/2023    DM/CKD GFR (Yearly)  Next due on 10/1/2023    Cervical Cancer Screen 30-64 - (PAP/HPV Co-Testing - Every 5 Years)  Next due on 12/12/2024    DTaP/Tdap/Td Vaccine (2 - Td or Tdap)  Next due on 2/10/2026    Pneumococcal Vaccine 0-64 (4 - PPSV23 if available, else PCV20)  Next due on 12/25/2035    Medicare Advantage- Medicare Wellness Visit   Completed    Influenza Vaccine   Completed    Meningococcal Vaccine   Aged Out    HPV Vaccine   Aged Out          Patient is due for topics as listed above but is not proceeding with Immunization(s) Hep B at this time. Education provided for Immunization(s) Hep B. .    COVID-19 Vaccine Interest Assessment:   • Might be interested in receiving COVID-19 vaccine later. Patient given the Covid 19 support hotline number at [phone] if they wish to schedule at later date.  If the patient reports an outside immunization, please update the WIR/ICARE information in the Immunizations activity        Patient information on fall and injury prevention

## 2022-11-16 NOTE — ED ADULT NURSE NOTE - PAIN RATING/NUMBER SCALE (0-10): REST
Patient is a 58 y/o female that presents for imbalance. This has been ongoing for the last 5 years or so. She has had frequent falls as well.  She denies associated spinning, nausea, visual disturbances, LOC, migraines.   Imbalance occurs when turning too fast, climbing high heights or leaning over.   Recent MRI brain scan without central component  Recent MRI L-spine noted with degenerative changes  Suspect imbalance is multifactorial due to osteoporotic changes, spine changes and lab changes  Neuro exam non focal; gait is antalgic but displayed poor tandem   - do not suspect NPH  Obtain MRI C-spine  Referral placed to outpt PT for balance  Encouraged pt to drink plenty fluids   - recent BUN has been elevated  
See plan above  
last MMSE 29/30  F/u as needed  
0

## 2022-12-04 ENCOUNTER — INPATIENT (INPATIENT)
Facility: HOSPITAL | Age: 52
LOS: 0 days | Discharge: HOME | DRG: 103 | End: 2022-12-05
Attending: HOSPITALIST | Admitting: INTERNAL MEDICINE
Payer: COMMERCIAL

## 2022-12-04 VITALS
SYSTOLIC BLOOD PRESSURE: 154 MMHG | OXYGEN SATURATION: 98 % | HEIGHT: 67 IN | WEIGHT: 125 LBS | HEART RATE: 90 BPM | DIASTOLIC BLOOD PRESSURE: 79 MMHG | RESPIRATION RATE: 18 BRPM | TEMPERATURE: 98 F

## 2022-12-04 DIAGNOSIS — I67.1 CEREBRAL ANEURYSM, NONRUPTURED: ICD-10-CM

## 2022-12-04 DIAGNOSIS — N89.8 OTHER SPECIFIED NONINFLAMMATORY DISORDERS OF VAGINA: Chronic | ICD-10-CM

## 2022-12-04 DIAGNOSIS — Z72.0 TOBACCO USE: ICD-10-CM

## 2022-12-04 DIAGNOSIS — Z01.810 ENCOUNTER FOR PREPROCEDURAL CARDIOVASCULAR EXAMINATION: ICD-10-CM

## 2022-12-04 DIAGNOSIS — I25.10 ATHEROSCLEROTIC HEART DISEASE OF NATIVE CORONARY ARTERY WITHOUT ANGINA PECTORIS: ICD-10-CM

## 2022-12-04 DIAGNOSIS — Z98.51 TUBAL LIGATION STATUS: Chronic | ICD-10-CM

## 2022-12-04 DIAGNOSIS — Z98.890 OTHER SPECIFIED POSTPROCEDURAL STATES: Chronic | ICD-10-CM

## 2022-12-04 DIAGNOSIS — I10 ESSENTIAL (PRIMARY) HYPERTENSION: ICD-10-CM

## 2022-12-04 DIAGNOSIS — Z90.710 ACQUIRED ABSENCE OF BOTH CERVIX AND UTERUS: Chronic | ICD-10-CM

## 2022-12-04 LAB
ANION GAP SERPL CALC-SCNC: 10 MMOL/L — SIGNIFICANT CHANGE UP (ref 5–17)
APTT BLD: 29 SEC — SIGNIFICANT CHANGE UP (ref 27.5–35.5)
BASOPHILS # BLD AUTO: 0.03 K/UL — SIGNIFICANT CHANGE UP (ref 0–0.2)
BASOPHILS NFR BLD AUTO: 0.6 % — SIGNIFICANT CHANGE UP (ref 0–2)
BLD GP AB SCN SERPL QL: SIGNIFICANT CHANGE UP
BUN SERPL-MCNC: 13.8 MG/DL — SIGNIFICANT CHANGE UP (ref 8–20)
CALCIUM SERPL-MCNC: 9.4 MG/DL — SIGNIFICANT CHANGE UP (ref 8.4–10.5)
CHLORIDE SERPL-SCNC: 104 MMOL/L — SIGNIFICANT CHANGE UP (ref 96–108)
CK SERPL-CCNC: 81 U/L — SIGNIFICANT CHANGE UP (ref 25–170)
CO2 SERPL-SCNC: 26 MMOL/L — SIGNIFICANT CHANGE UP (ref 22–29)
CREAT SERPL-MCNC: 0.61 MG/DL — SIGNIFICANT CHANGE UP (ref 0.5–1.3)
EGFR: 108 ML/MIN/1.73M2 — SIGNIFICANT CHANGE UP
EOSINOPHIL # BLD AUTO: 0.04 K/UL — SIGNIFICANT CHANGE UP (ref 0–0.5)
EOSINOPHIL NFR BLD AUTO: 0.8 % — SIGNIFICANT CHANGE UP (ref 0–6)
GLUCOSE SERPL-MCNC: 96 MG/DL — SIGNIFICANT CHANGE UP (ref 70–99)
HCT VFR BLD CALC: 41.7 % — SIGNIFICANT CHANGE UP (ref 34.5–45)
HGB BLD-MCNC: 13.6 G/DL — SIGNIFICANT CHANGE UP (ref 11.5–15.5)
IMM GRANULOCYTES NFR BLD AUTO: 0.2 % — SIGNIFICANT CHANGE UP (ref 0–0.9)
INR BLD: 1 RATIO — SIGNIFICANT CHANGE UP (ref 0.88–1.16)
LYMPHOCYTES # BLD AUTO: 1.32 K/UL — SIGNIFICANT CHANGE UP (ref 1–3.3)
LYMPHOCYTES # BLD AUTO: 25 % — SIGNIFICANT CHANGE UP (ref 13–44)
MCHC RBC-ENTMCNC: 28.4 PG — SIGNIFICANT CHANGE UP (ref 27–34)
MCHC RBC-ENTMCNC: 32.6 GM/DL — SIGNIFICANT CHANGE UP (ref 32–36)
MCV RBC AUTO: 87.1 FL — SIGNIFICANT CHANGE UP (ref 80–100)
MONOCYTES # BLD AUTO: 0.37 K/UL — SIGNIFICANT CHANGE UP (ref 0–0.9)
MONOCYTES NFR BLD AUTO: 7 % — SIGNIFICANT CHANGE UP (ref 2–14)
NEUTROPHILS # BLD AUTO: 3.52 K/UL — SIGNIFICANT CHANGE UP (ref 1.8–7.4)
NEUTROPHILS NFR BLD AUTO: 66.4 % — SIGNIFICANT CHANGE UP (ref 43–77)
NT-PROBNP SERPL-SCNC: 51 PG/ML — SIGNIFICANT CHANGE UP (ref 0–300)
PLATELET # BLD AUTO: 262 K/UL — SIGNIFICANT CHANGE UP (ref 150–400)
POTASSIUM SERPL-MCNC: 4.5 MMOL/L — SIGNIFICANT CHANGE UP (ref 3.5–5.3)
POTASSIUM SERPL-SCNC: 4.5 MMOL/L — SIGNIFICANT CHANGE UP (ref 3.5–5.3)
PROTHROM AB SERPL-ACNC: 11.6 SEC — SIGNIFICANT CHANGE UP (ref 10.5–13.4)
RAPID RVP RESULT: SIGNIFICANT CHANGE UP
RBC # BLD: 4.79 M/UL — SIGNIFICANT CHANGE UP (ref 3.8–5.2)
RBC # FLD: 14.4 % — SIGNIFICANT CHANGE UP (ref 10.3–14.5)
SARS-COV-2 RNA SPEC QL NAA+PROBE: SIGNIFICANT CHANGE UP
SODIUM SERPL-SCNC: 140 MMOL/L — SIGNIFICANT CHANGE UP (ref 135–145)
TROPONIN T SERPL-MCNC: <0.01 NG/ML — SIGNIFICANT CHANGE UP (ref 0–0.06)
WBC # BLD: 5.29 K/UL — SIGNIFICANT CHANGE UP (ref 3.8–10.5)
WBC # FLD AUTO: 5.29 K/UL — SIGNIFICANT CHANGE UP (ref 3.8–10.5)

## 2022-12-04 PROCEDURE — 70551 MRI BRAIN STEM W/O DYE: CPT | Mod: 26

## 2022-12-04 PROCEDURE — 99220: CPT

## 2022-12-04 PROCEDURE — 70496 CT ANGIOGRAPHY HEAD: CPT | Mod: 26,MA

## 2022-12-04 PROCEDURE — 99223 1ST HOSP IP/OBS HIGH 75: CPT

## 2022-12-04 PROCEDURE — 70548 MR ANGIOGRAPHY NECK W/DYE: CPT | Mod: 26

## 2022-12-04 PROCEDURE — 93010 ELECTROCARDIOGRAM REPORT: CPT

## 2022-12-04 PROCEDURE — 99254 IP/OBS CNSLTJ NEW/EST MOD 60: CPT

## 2022-12-04 RX ORDER — ONDANSETRON 8 MG/1
4 TABLET, FILM COATED ORAL EVERY 8 HOURS
Refills: 0 | Status: DISCONTINUED | OUTPATIENT
Start: 2022-12-04 | End: 2022-12-05

## 2022-12-04 RX ORDER — METOCLOPRAMIDE HCL 10 MG
10 TABLET ORAL ONCE
Refills: 0 | Status: COMPLETED | OUTPATIENT
Start: 2022-12-04 | End: 2022-12-04

## 2022-12-04 RX ORDER — PANTOPRAZOLE SODIUM 20 MG/1
40 TABLET, DELAYED RELEASE ORAL
Refills: 0 | Status: DISCONTINUED | OUTPATIENT
Start: 2022-12-04 | End: 2022-12-05

## 2022-12-04 RX ORDER — SODIUM CHLORIDE 9 MG/ML
1000 INJECTION, SOLUTION INTRAVENOUS
Refills: 0 | Status: DISCONTINUED | OUTPATIENT
Start: 2022-12-04 | End: 2022-12-05

## 2022-12-04 RX ORDER — NICOTINE POLACRILEX 2 MG
1 GUM BUCCAL DAILY
Refills: 0 | Status: DISCONTINUED | OUTPATIENT
Start: 2022-12-04 | End: 2022-12-05

## 2022-12-04 RX ORDER — AMLODIPINE BESYLATE 2.5 MG/1
1 TABLET ORAL
Qty: 0 | Refills: 0 | DISCHARGE

## 2022-12-04 RX ORDER — ACETAMINOPHEN 500 MG
1000 TABLET ORAL ONCE
Refills: 0 | Status: COMPLETED | OUTPATIENT
Start: 2022-12-04 | End: 2022-12-04

## 2022-12-04 RX ORDER — ACETAMINOPHEN 500 MG
650 TABLET ORAL EVERY 6 HOURS
Refills: 0 | Status: DISCONTINUED | OUTPATIENT
Start: 2022-12-04 | End: 2022-12-05

## 2022-12-04 RX ORDER — LANOLIN ALCOHOL/MO/W.PET/CERES
3 CREAM (GRAM) TOPICAL AT BEDTIME
Refills: 0 | Status: DISCONTINUED | OUTPATIENT
Start: 2022-12-04 | End: 2022-12-05

## 2022-12-04 RX ORDER — AMLODIPINE BESYLATE 2.5 MG/1
10 TABLET ORAL DAILY
Refills: 0 | Status: DISCONTINUED | OUTPATIENT
Start: 2022-12-04 | End: 2022-12-05

## 2022-12-04 RX ADMIN — Medication 1 PATCH: at 11:30

## 2022-12-04 RX ADMIN — SODIUM CHLORIDE 65 MILLILITER(S): 9 INJECTION, SOLUTION INTRAVENOUS at 23:28

## 2022-12-04 RX ADMIN — Medication 10 MILLIGRAM(S): at 02:28

## 2022-12-04 RX ADMIN — AMLODIPINE BESYLATE 10 MILLIGRAM(S): 2.5 TABLET ORAL at 11:33

## 2022-12-04 RX ADMIN — Medication 650 MILLIGRAM(S): at 21:41

## 2022-12-04 RX ADMIN — Medication 1 PATCH: at 19:08

## 2022-12-04 RX ADMIN — ONDANSETRON 4 MILLIGRAM(S): 8 TABLET, FILM COATED ORAL at 22:56

## 2022-12-04 RX ADMIN — PANTOPRAZOLE SODIUM 40 MILLIGRAM(S): 20 TABLET, DELAYED RELEASE ORAL at 17:49

## 2022-12-04 RX ADMIN — Medication 1000 MILLIGRAM(S): at 03:52

## 2022-12-04 RX ADMIN — Medication 1000 MILLIGRAM(S): at 06:52

## 2022-12-04 RX ADMIN — Medication 400 MILLIGRAM(S): at 02:27

## 2022-12-04 RX ADMIN — Medication 650 MILLIGRAM(S): at 22:11

## 2022-12-04 NOTE — ED PROVIDER NOTE - OBJECTIVE STATEMENT
52yof w/ hx of CAD states she has had 3 or 4 days of intermittent left sided headache, reports a sharp severe pain in the left temple that comes on suddenly, lasts about 15 minutes and then resolves. Denies any trigger at onset. No associated symptoms. DOes not usually get headaches. Tonight headache lasted longer than usual prompting ED visit. No fevers, chills or other systemic symptoms. No neurologic deficits. 52yof w/ hx of CAD states she has had 3 or 4 days of intermittent right sided headache, reports a sharp severe pain in the right temple and behind the right eye that comes on suddenly, lasts about 15 minutes and then resolves. Denies any trigger at onset. No associated symptoms. DOes not usually get headaches. Tonight headache lasted longer than usual prompting ED visit. No fevers, chills or other systemic symptoms. No neurologic deficits.

## 2022-12-04 NOTE — ED CDU PROVIDER INITIAL DAY NOTE - NSICDXFAMILYHX_GEN_ALL_CORE_FT
FAMILY HISTORY:  Father  Still living? Unknown  Family history of premature CAD, Age at diagnosis: Age Unknown

## 2022-12-04 NOTE — H&P ADULT - ASSESSMENT
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 Pt is seen by Neurosurgery,recommends lumbar puncture to rule out xanthochromia,discussed risks/benefits.Patient  deferred lumbar puncture at this time, but amenable to MRI. pending MRI/MRA brain,plan for angiogram.       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   -Recommend MR brain without contrast to rule out potential occult hemorrhage not seen on CT head, and MRA neck with contrast to evaluate neck vasculature  -pending MRI/MRA brain  -patient refusing LP at this time  -seen by neurosurgery plan for cerebral angio tomorrow   -Pt agreed with plan  -Will check esr  -pain controlled      HTN  -stable  -continue Amlodipine    CAD s/p NSTMI S/P  C, s/p IVUS, moderate proximal LAD 06/08/2020  -Last cardiology f/u 2 yrs ago per pt, cath done [facility]  -Not taking any statin  -Check lipid panel  -last TTE EF 65-70% 06/2020        Active smoker   - Nicotine patch  - counselled to quit    dvt ppx scd's    Plan of care dw pt. 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 Pt is seen by Neurosurgery,recommends lumbar puncture to rule out xanthochromia,discussed risks/benefits.Patient  deferred lumbar puncture at this time, but amenable to MRI. pending MRI/MRA brain,plan for angiogram.       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   -Recommend MR brain without contrast to rule out potential occult hemorrhage not seen on CT head, and MRA neck with contrast to evaluate neck vasculature  -pending MRI/MRA brain  -patient refusing LP at this time  -seen by neurosurgery plan for cerebral angio tomorrow. c/s [facility] cardiology for clearance.  -Pt agreed with plan  -Will check esr  -pain controlled      HTN  -stable  -continue Amlodipine    CAD s/p NSTMI S/P  LHC, s/p IVUS, moderate proximal LAD 06/08/2020  -Last cardiology f/u 2 yrs ago per pt, cath done [facility]  -Not taking any statin  -Check lipid panel  -last TTE EF 65-70% 06/2020        Active smoker   - Nicotine patch  - counselled to quit    dvt ppx scd's    Plan of care kelly pt. kelly neurosurgery team

## 2022-12-04 NOTE — ED CDU PROVIDER DISPOSITION NOTE - CLINICAL COURSE
53 yo female w/ hx of CAD , HTN states she has had 3 or 4 days of intermittent right sided headache, reports a sharp severe pain in the right temple and behind the right eye that comes on suddenly, lasts about 15 minutes and then resolves. states when she came in ER h.a was 10/10 . Denies any trigger at onset w.o photophobia or phonophobia, never had h.a like before. No associated symptoms. Dose not usually get headaches. Tonight headache lasted longer than usual prompting ED visit. No fevers, chills or other systemic symptoms. No neurologic deficits. pt states the headache is 2/10. states she feels some tingling sensation on the left hand as well.  Patient seen by neurosurgery, reccomending MRI with and without contrast, will do angiogram tomorrow, coags, NPO at midnight.

## 2022-12-04 NOTE — H&P ADULT - HISTORY OF PRESENT ILLNESS
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 Pt is seen by Neurosurgery,recommends LP But pt refused,pending MRI/MRA brain,plan for angiogram. Pt was placed on observation. Will admit to medicine. Pt still has rt sided headache but better now. Denies weakness,numbness,photophobia,vision change,neck stiffness,fever,chill,n/v/cp,sob,dizziness.      PAST MEDICAL HISTORY:  NSTEMI: s/p LHC, s/p IVUS, moderate proximal LAD 06/08/2020  Depressed   Hypertension.   h/o tubal ligation  OA   right heel surgery x 2 for infection,   headache  smoker  S/P foot surgery had surgery x2 to rt foot for abscess    Fibroid S/P hysterectomy     S/P tubal ligation   Vaginal cyst remove.     FAMILY HISTORY:  Family history of cardiac disorder, both parents and all siblings.    Social History:   +smoker 3-4 cigarettes's/day. Alcohol daily- two beers/month.last drink 2 weeks ago. pt states she was drinking weekly in past. 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 Pt is seen by Neurosurgery,recommends LP But pt refused,pending MRI/MRA brain,plan for angiogram. Pt was placed on observation. Will admit to medicine. Pt still has rt sided headache but better now. Denies weakness,numbness,photophobia,vision change,neck stiffness,fever,chill,n/v/cp,sob,dizziness.      PAST MEDICAL HISTORY:  NSTEMI: s/p LHC, s/p IVUS, moderate proximal LAD 06/08/2020  Depressed   Hypertension.   h/o tubal ligation  OA   right heel surgery x 2 for infection,   headache  smoker  S/P foot surgery had surgery x2 to rt foot for abscess    Fibroid S/P hysterectomy     S/P tubal ligation   Vaginal cyst remove.     FAMILY HISTORY:  Family history of cardiac disorder, both parents and all siblings.    Social History:   +smoker 3-4 cigarettes's/day. Alcohol daily- two beers/month.last drink 2 weeks ago. pt states she was drinking weekly in past. denies heavy alcohol drinking    denies illicit drugs uses    < from: TTE Echo Complete w/o Contrast w/ Doppler (06.06.20 @ 08:33) >   Mild fibrocalcific changes noted to the mitral valve leaflets with   preserved leaflet excursion.   Tracemitral regurgitation is present.   The aortic valve is trileaflet with thin pliable leaflets.   The tricuspid valve leaflets are thin and pliable; valve motion is normal.   Trace tricuspid valve regurgitation is present.   Mild concentric left ventricular hypertrophy is present.   Estimated left ventricular ejection fraction is 65-70 %.   Normal appearing right ventricle structure and function.    < end of copied text >

## 2022-12-04 NOTE — CONSULT NOTE ADULT - ASSESSMENT
52 year old woman with chronic tobacco use and CAD with new onset of atypical headache for 5 days found to have a right ICA aneurysm that is concerning for instability.     CT angiogram images are suboptimal and cerebral angiography is recommended.   I discussed with the patient the details of the procedure.  The benefits, alternatives and risks of cerebral angiography with embolization of intracranial aneurysm with possible vascular remodeling assistance (balloon/stenting) with or without flow diversion were explained in detail including potentially debilitating ischemic stroke, arterial dissection, disabling or fatal intracranial hemorrhage, contrast reaction, radiation exposure, and the possibility of death. No guarantees for the success of neurointerventional procedure were made. Patient was in agreement with plan and conveyed good understanding.    IF a cerebral aneurysm is confirmed on angiography, then we will plan for treatment due to significant nature of patient's symptoms and high risk of potentially fatal or disabling subarachnoid hemorrhage.     PLAN;  1. Cerebral angiogram tomorrow, if aneurysm is confirmed and amenable for endovascular treatment, will proceed with treatment.   2. Cardiology/medical evaluation recommended for preprocedure risk assessment and optimization for potential general anesthesia.   3. Normotension    
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
52yF PMH HTN, Depression, CAD, NSTEMI (6/2020 LAD ostial 50%), active smoker presents to ED complaining of 4 days of right sided headache behind her eye/by her right temple. 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 Pt is seen by Neurosurgery, plan for angiogram. Cardiology called for pre-operative evaluation, conscious sedation for angiogram with possible escalation to general anesthesia. Denies chest pain, shortness of breath. METS >4.

## 2022-12-04 NOTE — ED CDU PROVIDER INITIAL DAY NOTE - MEDICAL DECISION MAKING DETAILS
51 yo female w/ hx of CAD , HTN states she has had 3 or 4 days of intermittent right sided headache, reports a sharp severe pain in the right temple and behind the right eye that comes on suddenly, lasts about 15 minutes and then resolves. states when she came in ER h.a was 10/10 . Denies any trigger at onset w.o photophobia or phonophobia, never had h.a like before and CTA with 3 mm aneurysm at the right ICA terminus.    -pt dose not want to get LP now   - pending MRI   - HTN on med   - f.u with neurosurgery as well

## 2022-12-04 NOTE — CONSULT NOTE ADULT - NS ATTEND AMEND GEN_ALL_CORE FT
NSGY Attg:    see above    patient seen and examined    imaging reviewed and d/w Dr. Garcia    agree with exam and plan as above  patient refusing LP at this time  possible angio tomorrow if patient amenable  MRI brain pending
Pre-operative cardiovascular risk evaluation and management.   No cardiac symptoms. Non-ischemic ECG. METs > 4.    RCRI (revised cardiac risk index score) class II risk 6%.   No further cardiac work up is needed. Further cardiac work up will not change risk of the patient. Proceed for surgery as indicated.    CAD:  [facility] 2020 ostial LAD 50%.  would start atorvastatin 20mg PO daily.  cont asa if ok with neurosx  -out patient follow up with cardiology

## 2022-12-04 NOTE — CONSULT NOTE ADULT - PROBLEM SELECTOR RECOMMENDATION 2
.  - [facility] 2020 ostial LAD 50%  - would start atorvastatin 20mg PO daily  - cont asa if ok with neurosx  - out patient follow up with cardiology for continued CAD evaluation. .  - [facility] 2020 ostial LAD 50%  - would start atorvastatin 20mg PO daily  - cont asa if ok with neurosx  - out patient follow up with cardiology

## 2022-12-04 NOTE — CHART NOTE - NSCHARTNOTEFT_GEN_A_CORE
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   Pt c/o headache again at this time  Pt states headache was relieved by Ofirmev earlier and now is back  Pt states H/A is the same without change in intensity or location   Denies change in vision, weakness or any neurological changes  VSS B/P 118/70 P 72 R 18 PO 96% RA T 98.5  Pt sleeping NAD appears comfortable  PERRL CN 2-12 grossly intact  Strength equal , speech clear  Percocet po x 1  Continue to monitor  Please call PA if any changes

## 2022-12-04 NOTE — H&P ADULT - NSHPPHYSICALEXAM_GEN_ALL_CORE
T(C): 36.7 (12-04-22 @ 11:06), Max: 37 (12-04-22 @ 06:23)  HR: 62 (12-04-22 @ 11:06) (61 - 90)  BP: 121/76 (12-04-22 @ 11:06) (110/71 - 154/79)  RR: 18 (12-04-22 @ 11:06) (16 - 18)  SpO2: 100% (12-04-22 @ 11:06) (97% - 100%)    GEN - NAD  HEENT - NCAT, EOMI, ELTON, mild tender rt temple   RESP - CTA BL, no wheeze/rhonchi/crackles. not on supplemental O2.  CARDIO - NS1S2, RRR. No murmurs  ABD - Soft/Non tender/Non distended. Normal BS x4 quadrants.   Ext - No JOSE.   MSK - full ROM of BL upper and lower extremities without pain or restriction. BL 5/5 strength on upper and lower extremities.   Neuro - cn 2-12 grossly intact. . no visible seizure activity appreciated. no tremor. gait not observed.   Skin - clean, dry, intact. no rashes or lesions.    Psych- AAOx3. no suicidal/homicidal ideation. appropriate behaviour. attentive. normal affect.

## 2022-12-04 NOTE — CONSULT NOTE ADULT - SUBJECTIVE AND OBJECTIVE BOX
HISTORY OF PRESENT ILLNESS:   52yF PMH CAD s/p MI 3 years ago with history of heart cath on ASA 81, HTN on amlodipine and compliant, active smoker 5 cigarettes/day x 39 years, h/o tubal ligation ultimately s/p hysterectomy, right heel surgery x 2 for infection, arthritis, presents to ED complaining of 4 days of right sided headache behind her eye/by her right temple. Describes it as sharp pain, without other prodromal symptoms, often lasting about 15-20 minutes, usually self limited after laying down, now presents to ED due to it happening 6 times this morning, with the most recent episode described as the worst head pain she's ever experienced in her life. Currently, it has resolved, and she denies dizziness, neck pain, visual changes, weakness, numbness, n/v, chest pain, SOB, abdominal pain. Does attest to recent more personal life stress, as she recently lost her sister    PAST MEDICAL & SURGICAL HISTORY:  Hypertension  Depressed  S/P tubal ligation  S/P hysterectomy  Vaginal cyst  S/P foot surgery had surgery x2 to rt foot for abscess    FAMILY HISTORY:  Family history of cardiac disorder both parents and all siblings  No known family history of aneurysm    SOCIAL HISTORY:  Tobacco Use: Active smoker, 5 cigarettes daily x 39 years (used to be more)  EtOH use: Occasional  Substance: Denies current or history of use    Allergies  No Know Allergies    REVIEW OF SYSTEMS  Negative except as noted in HPI    HOME MEDICATIONS:  Home Medications: Amlodipine 10, Aspirin 81    MEDICATIONS:  Antibiotics:    Neuro:    Anticoagulation:    OTHER:    IVF:    Vital Signs Last 24 Hrs  T(C): 36.7 (04 Dec 2022 00:59), Max: 36.7 (04 Dec 2022 00:59)  T(F): 98.1 (04 Dec 2022 00:59), Max: 98.1 (04 Dec 2022 00:59)  HR: 90 (04 Dec 2022 00:59) (90 - 90)  BP: 154/79 (04 Dec 2022 00:59) (154/79 - 154/79)  BP(mean): --  RR: 18 (04 Dec 2022 00:59) (18 - 18)  SpO2: 98% (04 Dec 2022 00:59) (98% - 98%)    Parameters below as of 04 Dec 2022 00:59  Patient On (Oxygen Delivery Method): room air    PHYSICAL EXAM:  GENERAL: NAD, well groomed, thin, pleasant and cooperative with exam  HEAD:  Atraumatic, normocephalic  NECK: Supple, nontender to palpation, FROM without pain  TONEY COMA SCORE: E- 4 V- 5 M- 6=15  MENTAL STATUS: AAO x3; Appropriately conversant without aphasia; following commands  CRANIAL NERVES: Visual fields full to confrontation, PERRL. EOMI without nystagmus. Facial sensation intact V1-3 distribution b/l. Face symmetric w/ normal eye closure and smile, tongue midline. Hearing grossly intact. Speech clear  MOTOR: strength 5/5 b/l upper and lower extremities, no drift  SENSATION: grossly intact to light touch all extremities  COORDINATION: Gait intact; no upper extremity dysmetria; heel to shin intact  CHEST/LUNG: Nonlabored breathing, no respiratory distress  ABDOMEN: Soft, nontender, nondistended  EXTREMITIES: Nonedematous; nontender to palpation    LABS:                        13.6   5.29  )-----------( 262      ( 04 Dec 2022 02:28 )             41.7     12-04    140  |  104  |  13.8  ----------------------------<  96  4.5   |  26.0  |  0.61    Ca    9.4      04 Dec 2022 02:28    RADIOLOGY & ADDITIONAL STUDIES:  CT Head No Cont (12.04.22 @ 04:29)  IMPRESSION:  1. Normal noncontrast CT head for age.  2. 3 mm aneurysm at the right ICA terminus. HISTORY OF PRESENT ILLNESS:   52yF PMH CAD s/p MI 3 years ago with history of heart cath on ASA 81, HTN on amlodipine and compliant, active smoker 5 cigarettes/day x 39 years, h/o tubal ligation ultimately s/p hysterectomy, right heel surgery x 2 for infection, arthritis, presents to ED complaining of 4 days of right sided headache behind her eye/by her right temple. Describes it as sharp pain, without other prodromal symptoms, often lasting about 15-20 minutes, usually self limited after laying down, now presents to ED due to it happening 6 times this morning, lasting longer in time, and more sever in pain with the most recent episode described as the worst head pain she's ever experienced in her life. Currently, it has resolved, and she denies dizziness, neck pain, visual changes, weakness, numbness, n/v, chest pain, SOB, abdominal pain. Does attest to recent more personal life stress, as she recently lost her sister    PAST MEDICAL & SURGICAL HISTORY:  Hypertension  Depressed  S/P tubal ligation  S/P hysterectomy  Vaginal cyst  S/P foot surgery had surgery x2 to rt foot for abscess    FAMILY HISTORY:  Family history of cardiac disorder both parents and all siblings  No known family history of aneurysm    SOCIAL HISTORY:  Tobacco Use: Active smoker, 5 cigarettes daily x 39 years (used to be more)  EtOH use: Occasional  Substance: Denies current or history of use    Allergies  No Know Allergies    REVIEW OF SYSTEMS  Negative except as noted in HPI    HOME MEDICATIONS:  Home Medications: Amlodipine 10, Aspirin 81    MEDICATIONS:  Antibiotics:    Neuro:    Anticoagulation:    OTHER:    IVF:    Vital Signs Last 24 Hrs  T(C): 36.7 (04 Dec 2022 00:59), Max: 36.7 (04 Dec 2022 00:59)  T(F): 98.1 (04 Dec 2022 00:59), Max: 98.1 (04 Dec 2022 00:59)  HR: 90 (04 Dec 2022 00:59) (90 - 90)  BP: 154/79 (04 Dec 2022 00:59) (154/79 - 154/79)  BP(mean): --  RR: 18 (04 Dec 2022 00:59) (18 - 18)  SpO2: 98% (04 Dec 2022 00:59) (98% - 98%)    Parameters below as of 04 Dec 2022 00:59  Patient On (Oxygen Delivery Method): room air    PHYSICAL EXAM:  GENERAL: NAD, well groomed, thin, pleasant and cooperative with exam  HEAD:  Atraumatic, normocephalic  NECK: Supple, nontender to palpation, FROM without pain  TONEY COMA SCORE: E- 4 V- 5 M- 6=15  MENTAL STATUS: AAO x3; Appropriately conversant without aphasia; following commands  CRANIAL NERVES: Visual fields full to confrontation, PERRL. EOMI without nystagmus. Facial sensation intact V1-3 distribution b/l. Face symmetric w/ normal eye closure and smile, tongue midline. Hearing grossly intact. Speech clear  MOTOR: strength 5/5 b/l upper and lower extremities, no drift  SENSATION: grossly intact to light touch all extremities  COORDINATION: Gait intact; no upper extremity dysmetria; heel to shin intact  CHEST/LUNG: Nonlabored breathing, no respiratory distress  ABDOMEN: Soft, nontender, nondistended  EXTREMITIES: Nonedematous; nontender to palpation    LABS:                        13.6   5.29  )-----------( 262      ( 04 Dec 2022 02:28 )             41.7     12-04    140  |  104  |  13.8  ----------------------------<  96  4.5   |  26.0  |  0.61    Ca    9.4      04 Dec 2022 02:28    RADIOLOGY & ADDITIONAL STUDIES:  CT Head No Cont (12.04.22 @ 04:29)  IMPRESSION:  1. Normal noncontrast CT head for age.  2. 3 mm aneurysm at the right ICA terminus.

## 2022-12-04 NOTE — PATIENT PROFILE ADULT - FALL HARM RISK - UNIVERSAL INTERVENTIONS
Bed in lowest position, wheels locked, appropriate side rails in place/Call bell, personal items and telephone in reach/Instruct patient to call for assistance before getting out of bed or chair/Non-slip footwear when patient is out of bed/[place] to call system/Physically safe environment - no spills, clutter or unnecessary equipment/Purposeful Proactive Rounding/Room/bathroom lighting operational, light cord in reach

## 2022-12-04 NOTE — CONSULT NOTE ADULT - SUBJECTIVE AND OBJECTIVE BOX
[facility]                                              CARDIOLOGY AT [address], [place] [address]                                             Telephone: [phone]. Fax:[phone]                                                       CARDIOLOGY CONSULTATION NOTE                                                                                             History obtained by: Patient and medical record  Community Cardiologist:    obtained: Yes [  ] No [  ]  Reason for Consultation:   Available out pt records reviewed: Yes [  ] No [  ]    Chief complaint:    Patient is a 52y old  Female who presents with a chief complaint of headache (04 Dec 2022 12:10)      HPI:  52yF PMH HTN, Depression, CAD, NSTEMI (6/2020 LAD ostial 50%), active smoker presents to ED complaining of 4 days of right sided headache behind her eye/by her right temple. 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 Pt is seen by Neurosurgery, plan for angiogram. Cardiology called for pre-operative evaluation, conscious sedation for angiogram with possible escalation to general anesthesia. Denies chest pain, shortness of breath. METS >4.         (04 Dec 2022 12:10)      CARDIAC TESTING   < from: Cardiac Cath Lab - Adult (06.08.20 @ 08:16) >   Cardiac Arteries and Lesion Findings    LMCA: Normal.  LAD: Abnormal.There is a 50% stenosis noted in the ostial portion of the  vessel.  Prox LAD: Ostial.50% stenosis.  LCx: Normal.  RCA: Normal.      < end of copied text >    TTE Echo Complete w/o Contrast w/ Doppler (06.06.20 @ 08:33) >   Mild fibrocalcific changes noted to the mitral valve leaflets with   preserved leaflet excursion.   Tracemitral regurgitation is present.   The aortic valve is trileaflet with thin pliable leaflets.   The tricuspid valve leaflets are thin and pliable; valve motion is normal.   Trace tricuspid valve regurgitation is present.   Mild concentric left ventricular hypertrophy is present.   Estimated left ventricular ejection fraction is 65-70 %.   Normal appearing right ventricle structure and function.  \\  PAST MEDICAL HISTORY  Hypertension  Depressed  CAD (coronary artery disease)        PAST SURGICAL HISTORY  S/P tubal ligation  S/P hysterectomy  Vaginal cyst  S/P foot surgery      SOCIAL HISTORY:   CIGARETTES: current smoker   . Alcohol daily- two beers/month.last drink 2 weeks ago. pt states she was drinking weekly in past. denies heavy alcohol drinking    denies illicit drugs uses          FAMILY HISTORY:  Family history of premature CAD (Father)  sister x 4   colon cancer sister        HOME MEDICATIONS:  amLODIPine 10 mg oral tablet: 1 tab(s) orally once a day (04 Dec 2022 12:07)      CURRENT CARDIAC MEDICATIONS:  amLODIPine Tablet 10 milliGRAM(s) Oral daily      CURRENT OTHER MEDICATIONS:  acetaminophen     Tablet .. 650 milliGRAM(s) Oral every 6 hours PRN Temp greater or equal to 38C (100.4F), Mild Pain (1 - 3)  melatonin 3 milliGRAM(s) Oral at bedtime PRN Insomnia  ondansetron Injectable 4 milliGRAM(s) IV Push every 8 hours PRN Nausea and/or Vomiting  aluminum hydroxide/magnesium hydroxide/simethicone Suspension 30 milliLiter(s) Oral every 4 hours PRN Dyspepsia  pantoprazole    Tablet 40 milliGRAM(s) Oral before breakfast  dextrose 5% + sodium chloride 0.9%. 1000 milliLiter(s) (65 mL/Hr) IV Continuous <Continuous>  nicotine -  14 mG/24Hr(s) Patch 1 Patch Transdermal daily        ALLERGIES:   No Known Allergies        REVIEW OF SYMPTOMS:   CONSTITUTIONAL: No fever, no chills, no weight loss, no weight gain, no fatigue   ENMT:  No vertigo; No sinus or throat pain  NECK: No pain or stiffness  CARDIOVASCULAR: No chest pain, no dyspnea, no syncope/presyncope, no palpitations, no dizziness, no Orthopnea  RESPIRATORY: no Shortness of breath, no cough, no wheezing  : No dysuria, no hematuria   GI: No dark color stool, no nausea, no diarrhea, no constipation, no abdominal pain   NEURO: + headache, no slurred speech   MUSCULOSKELETAL: No joint pain or swelling; No muscle, back, or extremity pain  PSYCH: No agitation, no anxiety.    ALL OTHER REVIEW OF SYSTEMS ARE NEGATIVE.        VITAL SIGNS:  T(C): 37.1 (12-04-22 @ 15:40), Max: 37.1 (12-04-22 @ 15:40)  T(F): 98.8 (12-04-22 @ 15:40), Max: 98.8 (12-04-22 @ 15:40)  HR: 64 (12-04-22 @ 15:40) (61 - 90)  BP: 113/73 (12-04-22 @ 15:40) (110/71 - 154/79)  RR: 18 (12-04-22 @ 15:40) (16 - 18)  SpO2: 99% (12-04-22 @ 15:40) (97% - 100%)         PHYSICAL EXAM:  Constitutional: Comfortable . No acute distress.   HEENT: Atraumatic and normocephalic , neck is supple . no JVD.   CNS: A&Ox3. No focal deficits.   Respiratory: CTAB, unlabored   Cardiovascular: RRR normal s1 s2. No murmur. No rubs or gallop.  Gastrointestinal: Soft, non-tender. +Bowel sounds.   Extremities: No edema  Psychiatric: Calm . no agitation.   Skin: Warm and dry, no ulcers on extremities         LABS:  ( 04 Dec 2022 12:43 )  Troponin T  <0.01,  CPK  81   , CKMB  X    , BNP 51                               13.6   5.29  )-----------( 262      ( 04 Dec 2022 02:28 )             41.7     12-04    140  |  104  |  13.8  ----------------------------<  96  4.5   |  26.0  |  0.61    Ca    9.4      04 Dec 2022 02:28      PT/INR - ( 04 Dec 2022 06:51 )   PT: 11.6 sec;   INR: 1.00 ratio    PTT - ( 04 Dec 2022 06:51 )  PTT:29.0 sec      INTERPRETATION OF TELEMETRY: not on tele  ECG: SR, no ischemia, no infarct

## 2022-12-04 NOTE — CONSULT NOTE ADULT - SUBJECTIVE AND OBJECTIVE BOX
52 year old woman with chronic tobacco use and CAD history presents with 5 days of new onset of headache. She reports symptoms started 5 days ago with intermittent right hemispheric headaches. They would go away with rest and some compression on a pillow. But today, headache would not relent and she was concerned and reported to ED. CT head was performed, no acute intracranial hemorrhage was detected. CTA head was performed, there is a 3mm right supraclinoid ICA aneurysm. She declined LP. She has no headache history, no history of migraines. This is new, atypical headache. No other associated neuro symptoms.     Exam:  GENERAL APPEARANCE:  Well developed, well nourished woman in no acute distress.  CARDIOVASCULAR:  Regular rate and rhythm      NEUROLOGIC EXAM:  MENTAL STATUS:  Alert and Oriented to person, place and time.  Speech is fluent, without aphasia or dysarthria  Behavior and affect appropriate to situation.                          CRANIAL NERVES:  CN 2:    Visual fields are full to confrontation.  CN 3, 4, 6: Extraocular movements are intact. No nystagmus or ophthalmoplegia is evident. Pupils are equally round and reactive to light.  CN 5:     Facial sensation is intact to light touch in all 3 divisions  CN 7:     Facial excursion is full and symmetric bilaterally.  MOTOR:  Strength is 5/5 throughout for age and stature.  No orbiting or drift noted.  SENSORY:  Intact to light touch and sharp perception in all four extremities without extinction to double simultaneous stimulation  COORD:  Finger to nose testing without dysmetria bilaterally.  GAIT:  patient declined at this time.

## 2022-12-04 NOTE — ED CDU PROVIDER INITIAL DAY NOTE - ATTENDING APP SHARED VISIT CONTRIBUTION OF CARE
see shift change note above  MR pending   agree w plan New onset headache with incidental aneurysm on CTA, pt declined LP recommended by neurosurgery, will get follow up MR/MRA

## 2022-12-04 NOTE — ED CDU PROVIDER INITIAL DAY NOTE - NS ED ATTENDING STATEMENT MOD
This was a shared visit with the KELLEN. I reviewed and verified the documentation and independently performed the documented:

## 2022-12-04 NOTE — ED PROVIDER NOTE - CLINICAL SUMMARY MEDICAL DECISION MAKING FREE TEXT BOX
New onset headaches, 3mm aneurysm on CTA but no evidence of bleeding, seen by neurosurgery recommended LP but declined by pt, will get MR/MRA, neurosurgery to follow

## 2022-12-04 NOTE — ED ADULT NURSE REASSESSMENT NOTE - NS ED NURSE REASSESS COMMENT FT1
Patient resting in bed comfortably, reports mild headache, no visual changes, no n/V, resp even/unlabored, awaiting for MRI, wll continue to monitor patient.

## 2022-12-04 NOTE — ED CDU PROVIDER INITIAL DAY NOTE - PHYSICAL EXAMINATION
Const: AOX3 nontoxic appearing, no apparent respiratory or physical distress. Stable gait   HEENT: NC/AT. Moist mucous membranes.  Eyes: DELMY. EOMI  Neck: Soft and supple. Full ROM without pain.  Cardiac: Regular rate and regular rhythm. +S1/S2. No LE edema.  Resp: Speaking in full sentences. No evidence of respiratory distress.   Abd: Soft, non-tender, non-distended.  Skin: No visible rashes, abrasions or lacerations.  Lymph: No cervical lymphadenopathy.  Neuro: Awake, alert & oriented x 3. Moves all extremities symmetrically. bed side CN ii - Xii grossly intact

## 2022-12-04 NOTE — CONSULT NOTE ADULT - PROBLEM SELECTOR RECOMMENDATION 9
.  - Pre-operative cardiovascular risk evaluation and management.   No cardiac symptoms. Non-ischemic ECG. METs > 4.    RCRI (revised cardiac risk index score) class II risk 6%.   No further cardiac work up is needed. Further cardiac work up will not change risk of the patient. Proceed for surgery as indicated.

## 2022-12-04 NOTE — ED PROVIDER NOTE - CROS ED NEURO ALL NEG
16:00. Hold PT at this time 2* to low Hb 6.1. Pt is getting a blood transfusion at b/s. Will f/u with PT when pt is more hemodynamically stable. - - -

## 2022-12-04 NOTE — ED ADULT NURSE NOTE - NSIMPLEMENTINTERV_GEN_ALL_ED
Implemented All Universal Safety Interventions:  [place] to call system. Call bell, personal items and telephone within reach. Instruct patient to call for assistance. Room bathroom lighting operational. Non-slip footwear when patient is off stretcher. Physically safe environment: no spills, clutter or unnecessary equipment. Stretcher in lowest position, wheels locked, appropriate side rails in place.

## 2022-12-04 NOTE — ED CDU PROVIDER INITIAL DAY NOTE - OBJECTIVE STATEMENT
51 yo female w/ hx of CAD , HTN states she has had 3 or 4 days of intermittent right sided headache, reports a sharp severe pain in the right temple and behind the right eye that comes on suddenly, lasts about 15 minutes and then resolves. states when she came in ER h.a was 10/10 . Denies any trigger at onset w.o photophobia or phonophobia, never had h.a like before. No associated symptoms. Dose not usually get headaches. Tonight headache lasted longer than usual prompting ED visit. No fevers, chills or other systemic symptoms. No neurologic deficits. pt states the headache is 2/10. states she feels some tingling sensation on the left hand as well .

## 2022-12-04 NOTE — ED CDU PROVIDER INITIAL DAY NOTE - SHIFT CHANGE DETAILS
Patient in observation for pain control with headache.  Patient being followed by neurosurgery who after negative CT evaluation and physical exam offered LP but patient declined.  Neurosurgery subsequently recommended MRI which patient is currently waiting for.  Patient states headache is improved and has been able to get some sleep while here.  Agree with plan to check MR reassess and dispo. Discharged

## 2022-12-05 ENCOUNTER — TRANSCRIPTION ENCOUNTER (OUTPATIENT)
Age: 52
End: 2022-12-05

## 2022-12-05 ENCOUNTER — APPOINTMENT (OUTPATIENT)
Dept: NEUROLOGY | Facility: HOSPITAL | Age: 52
End: 2022-12-05

## 2022-12-05 VITALS
HEART RATE: 55 BPM | DIASTOLIC BLOOD PRESSURE: 72 MMHG | RESPIRATION RATE: 16 BRPM | OXYGEN SATURATION: 97 % | SYSTOLIC BLOOD PRESSURE: 111 MMHG

## 2022-12-05 LAB
ANION GAP SERPL CALC-SCNC: 9 MMOL/L — SIGNIFICANT CHANGE UP (ref 5–17)
APTT BLD: 25.4 SEC — LOW (ref 27.5–35.5)
BUN SERPL-MCNC: 16.5 MG/DL — SIGNIFICANT CHANGE UP (ref 8–20)
CALCIUM SERPL-MCNC: 9.1 MG/DL — SIGNIFICANT CHANGE UP (ref 8.4–10.5)
CHLORIDE SERPL-SCNC: 105 MMOL/L — SIGNIFICANT CHANGE UP (ref 96–108)
CHOLEST SERPL-MCNC: 182 MG/DL — SIGNIFICANT CHANGE UP
CO2 SERPL-SCNC: 28 MMOL/L — SIGNIFICANT CHANGE UP (ref 22–29)
CREAT SERPL-MCNC: 0.66 MG/DL — SIGNIFICANT CHANGE UP (ref 0.5–1.3)
EGFR: 105 ML/MIN/1.73M2 — SIGNIFICANT CHANGE UP
GLUCOSE SERPL-MCNC: 104 MG/DL — HIGH (ref 70–99)
HCT VFR BLD CALC: 39 % — SIGNIFICANT CHANGE UP (ref 34.5–45)
HDLC SERPL-MCNC: 81 MG/DL — SIGNIFICANT CHANGE UP
HGB BLD-MCNC: 12.9 G/DL — SIGNIFICANT CHANGE UP (ref 11.5–15.5)
INR BLD: 0.97 RATIO — SIGNIFICANT CHANGE UP (ref 0.88–1.16)
LIPID PNL WITH DIRECT LDL SERPL: 95 MG/DL — SIGNIFICANT CHANGE UP
MCHC RBC-ENTMCNC: 28.7 PG — SIGNIFICANT CHANGE UP (ref 27–34)
MCHC RBC-ENTMCNC: 33.1 GM/DL — SIGNIFICANT CHANGE UP (ref 32–36)
MCV RBC AUTO: 86.7 FL — SIGNIFICANT CHANGE UP (ref 80–100)
NON HDL CHOLESTEROL: 101 MG/DL — SIGNIFICANT CHANGE UP
PLATELET # BLD AUTO: 272 K/UL — SIGNIFICANT CHANGE UP (ref 150–400)
POTASSIUM SERPL-MCNC: 4.1 MMOL/L — SIGNIFICANT CHANGE UP (ref 3.5–5.3)
POTASSIUM SERPL-SCNC: 4.1 MMOL/L — SIGNIFICANT CHANGE UP (ref 3.5–5.3)
PROTHROM AB SERPL-ACNC: 11.3 SEC — SIGNIFICANT CHANGE UP (ref 10.5–13.4)
RBC # BLD: 4.5 M/UL — SIGNIFICANT CHANGE UP (ref 3.8–5.2)
RBC # FLD: 14.5 % — SIGNIFICANT CHANGE UP (ref 10.3–14.5)
SODIUM SERPL-SCNC: 142 MMOL/L — SIGNIFICANT CHANGE UP (ref 135–145)
TRIGL SERPL-MCNC: 30 MG/DL — SIGNIFICANT CHANGE UP
WBC # BLD: 6.6 K/UL — SIGNIFICANT CHANGE UP (ref 3.8–10.5)
WBC # FLD AUTO: 6.6 K/UL — SIGNIFICANT CHANGE UP (ref 3.8–10.5)

## 2022-12-05 PROCEDURE — 99406 BEHAV CHNG SMOKING 3-10 MIN: CPT

## 2022-12-05 PROCEDURE — 93005 ELECTROCARDIOGRAM TRACING: CPT

## 2022-12-05 PROCEDURE — 70551 MRI BRAIN STEM W/O DYE: CPT | Mod: MG

## 2022-12-05 PROCEDURE — 80061 LIPID PANEL: CPT

## 2022-12-05 PROCEDURE — 85027 COMPLETE CBC AUTOMATED: CPT

## 2022-12-05 PROCEDURE — C1894: CPT

## 2022-12-05 PROCEDURE — C1769: CPT

## 2022-12-05 PROCEDURE — 80048 BASIC METABOLIC PNL TOTAL CA: CPT

## 2022-12-05 PROCEDURE — 70548 MR ANGIOGRAPHY NECK W/DYE: CPT

## 2022-12-05 PROCEDURE — 86850 RBC ANTIBODY SCREEN: CPT

## 2022-12-05 PROCEDURE — 96375 TX/PRO/DX INJ NEW DRUG ADDON: CPT

## 2022-12-05 PROCEDURE — 86900 BLOOD TYPING SEROLOGIC ABO: CPT

## 2022-12-05 PROCEDURE — 70496 CT ANGIOGRAPHY HEAD: CPT | Mod: MA

## 2022-12-05 PROCEDURE — 84484 ASSAY OF TROPONIN QUANT: CPT

## 2022-12-05 PROCEDURE — 82550 ASSAY OF CK (CPK): CPT

## 2022-12-05 PROCEDURE — 83880 ASSAY OF NATRIURETIC PEPTIDE: CPT

## 2022-12-05 PROCEDURE — 70450 CT HEAD/BRAIN W/O DYE: CPT | Mod: MA

## 2022-12-05 PROCEDURE — 36224 PLACE CATH CAROTD ART: CPT | Mod: 50

## 2022-12-05 PROCEDURE — C1760: CPT

## 2022-12-05 PROCEDURE — 36224 PLACE CATH CAROTD ART: CPT

## 2022-12-05 PROCEDURE — 36227 PLACE CATH XTRNL CAROTID: CPT

## 2022-12-05 PROCEDURE — G1004: CPT

## 2022-12-05 PROCEDURE — 36226 PLACE CATH VERTEBRAL ART: CPT | Mod: 50

## 2022-12-05 PROCEDURE — 99239 HOSP IP/OBS DSCHRG MGMT >30: CPT

## 2022-12-05 PROCEDURE — 76377 3D RENDER W/INTRP POSTPROCES: CPT | Mod: 26

## 2022-12-05 PROCEDURE — 36415 COLL VENOUS BLD VENIPUNCTURE: CPT

## 2022-12-05 PROCEDURE — 86901 BLOOD TYPING SEROLOGIC RH(D): CPT

## 2022-12-05 PROCEDURE — 0225U NFCT DS DNA&RNA 21 SARSCOV2: CPT

## 2022-12-05 PROCEDURE — 96365 THER/PROPH/DIAG IV INF INIT: CPT

## 2022-12-05 PROCEDURE — G0378: CPT

## 2022-12-05 PROCEDURE — C1887: CPT

## 2022-12-05 PROCEDURE — 36226 PLACE CATH VERTEBRAL ART: CPT

## 2022-12-05 PROCEDURE — 85730 THROMBOPLASTIN TIME PARTIAL: CPT

## 2022-12-05 PROCEDURE — 99285 EMERGENCY DEPT VISIT HI MDM: CPT | Mod: 25

## 2022-12-05 PROCEDURE — 36227 PLACE CATH XTRNL CAROTID: CPT | Mod: RT

## 2022-12-05 PROCEDURE — 85025 COMPLETE CBC W/AUTO DIFF WBC: CPT

## 2022-12-05 PROCEDURE — 85610 PROTHROMBIN TIME: CPT

## 2022-12-05 RX ORDER — GABAPENTIN 400 MG/1
1 CAPSULE ORAL
Qty: 90 | Refills: 0
Start: 2022-12-05 | End: 2023-01-03

## 2022-12-05 RX ORDER — NICOTINE POLACRILEX 2 MG
1 GUM BUCCAL
Qty: 30 | Refills: 0
Start: 2022-12-05 | End: 2023-01-03

## 2022-12-05 RX ORDER — ATORVASTATIN CALCIUM 80 MG/1
1 TABLET, FILM COATED ORAL
Qty: 30 | Refills: 0
Start: 2022-12-05 | End: 2023-01-03

## 2022-12-05 RX ORDER — GABAPENTIN 400 MG/1
100 CAPSULE ORAL THREE TIMES A DAY
Refills: 0 | Status: DISCONTINUED | OUTPATIENT
Start: 2022-12-05 | End: 2022-12-05

## 2022-12-05 RX ADMIN — Medication 1 PATCH: at 08:24

## 2022-12-05 RX ADMIN — PANTOPRAZOLE SODIUM 40 MILLIGRAM(S): 20 TABLET, DELAYED RELEASE ORAL at 06:39

## 2022-12-05 NOTE — PROGRESS NOTE ADULT - SUBJECTIVE AND OBJECTIVE BOX
HPI:  52yF PMH CAD s/p MI 3 years ago with history of heart cath on ASA 81, HTN on amlodipine and compliant, active smoker 5 cigarettes/day x 39 years, h/o tubal ligation ultimately s/p hysterectomy, right heel surgery x 2 for infection, arthritis, presents to ED complaining of 4 days of right sided headache behind her eye/by her right temple. Describes it as sharp pain, without other prodromal symptoms, often lasting about 15-20 minutes, usually self limited after laying down, now presents to ED due to it happening 6 times this morning, lasting longer in time, and more sever in pain with the most recent episode described as the worst head pain she's ever experienced in her life. 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 LP recommended to R/O SAH however pt. refused.     INTERVAL HPI/OVERNIGHT EVENTS:  Pt. scheduled for angio this AM with neuro IR. MRI brain/MRA neck unremarkable. No overnight events reported.    Vital Signs Last 24 Hrs  T(C): 36.9 (05 Dec 2022 08:01), Max: 37.1 (04 Dec 2022 15:40)  T(F): 98.4 (05 Dec 2022 08:01), Max: 98.8 (04 Dec 2022 15:40)  HR: 63 (05 Dec 2022 08:01) (61 - 72)  BP: 112/67 (05 Dec 2022 08:01) (94/57 - 118/70)  BP(mean): --  RR: 16 (05 Dec 2022 08:01) (16 - 18)  SpO2: 99% (05 Dec 2022 08:01) (96% - 99%)    Parameters below as of 05 Dec 2022 08:01  Patient On (Oxygen Delivery Method): room air    GENERAL: NAD, well groomed  HEAD:  Atraumatic, normocephalic  NECK: Supple, nontender to palpation  TONEY COMA SCORE: E- 4 V- 5 M- 6=15  MENTAL STATUS: AAO x3; Appropriately conversant without aphasia; following commands  CRANIAL NERVES: Visual fields full to confrontation, PERRL. EOMI without nystagmus. Facial sensation intact V1-3 distribution b/l. Face symmetric w/ normal eye closure and smile, tongue midline.  MOTOR: strength 5/5 b/l upper and lower extremities, no drift  SENSATION: grossly intact to light touch all extremities    LABS:                        12.9   6.60  )-----------( 272      ( 05 Dec 2022 04:54 )             39.0     12-05    142  |  105  |  16.5  ----------------------------<  104<H>  4.1   |  28.0  |  0.66    Ca    9.1      05 Dec 2022 04:54      PT/INR - ( 05 Dec 2022 04:54 )   PT: 11.3 sec;   INR: 0.97 ratio         PTT - ( 05 Dec 2022 04:54 )  PTT:25.4 sec        RADIOLOGY & ADDITIONAL TESTS:  < from: MR Angio Neck w/ IV Cont (12.04.22 @ 14:32) >  IMPRESSION: No acute hemorrhage mass mass effect or acute territorial   infarcts seen.    Unremarkable MRA of the neck.    --- End of Report ---            ANDRES DANIELS MD; Attending Radiologist  This document has been electronically signed. Dec  4 2022  4:32PM    < end of copied text >    < from: CT Head No Cont (12.04.22 @ 04:29) >  IMPRESSION:  1. Normal noncontrast CT head for age.  2. 3 mm aneurysm at the right ICA terminus.    --- End of Report ---            MANDIE CERDA MD; Attending Radiologist  This document has been electronically signed. Dec  4 2022  4:43AM    < end of copied text >

## 2022-12-05 NOTE — PROGRESS NOTE ADULT - ASSESSMENT
52y F PMH HTN, Depression, CAD s/p NSTEMI S/P  LHC, s/p IVUS, moderate proximal LAD 06/08/2020, active smoker  presents to ED complaining of 4 days of right sided headache     #Headache   - ?aneurysm on ICA however s/p angiogram (negative for aneurysm)  - neuro IR consult appreciated  - neurosurgery consult appreciated  - start gabapentin 100tid (d/w Dr Garcia)    #HTN- Essential   - monitor blood pressure  - amlodipine    #HLD  - statin     #Active smoker   - Nicotine patch  - 5 minutes spent on smoking cessation    #DVT Prophylaxis  - venodynes    Patient signed out to PCP Dr Jimenez (talked to him on phone). plan of care also d/w patient family bedside.     d/w Neuro IR Dr Garcia and Dr Earl ok to d/c home and follow up w/ Dr Garcia in 2-3 weeks.

## 2022-12-05 NOTE — DISCHARGE NOTE PROVIDER - NSDCMRMEDTOKEN_GEN_ALL_CORE_FT
amLODIPine 10 mg oral tablet: 1 tab(s) orally once a day  aspirin 81 mg oral delayed release tablet: 1 tab(s) orally once a day  atorvastatin 20 mg oral tablet: 1 tab(s) orally once a day   gabapentin 100 mg oral capsule: 1 cap(s) orally 3 times a day  nicotine 14 mg/24 hr transdermal film, extended release: 1 patch transdermal once a day

## 2022-12-05 NOTE — DISCHARGE NOTE PROVIDER - DISCHARGE DATE
Subsequent Medicare Wellness Visit    Chief Complaint   Patient presents with   • Annual Exam      Subjective    History of Present Illness:  Sachin Tolliver is a 64 y.o. male who presents for a Subsequent Medicare Wellness Visit.    Compared to one year ago, the patient feels his physical health is the same and his mental health is the same.  Recent Hospitalizations:  He was not admitted to the hospital during the last year.     Current Medical Providers:  Patient Care Team:  Milvia Briceno MD as PCP - General  No opioid medication identified on active medication list. I have reviewed chart for other potential  high risk medication/s and harmful drug interactions in the elderly.          Aspirin is on active medication list. Aspirin use is indicated based on review of current medical condition/s. Pros and cons of this therapy have been discussed today. Benefits of this medication outweigh potential harm.  Patient has been encouraged to continue taking this medication.  .    Advance Care Planning   Advance Directive is on file.  ACP discussion was held with the patient during this visit. Patient has an advance directive in EMR which is still valid.   Objective    /80   Pulse 55   Resp 19   Wt 117 kg (259 lb)   SpO2 98%   BMI 33.25 kg/m²   Body mass index is 33.25 kg/m².  Body mass index is 33.25 kg/m².  BMI has not been calculated during today's encounter.     Does the patient have evidence of cognitive impairment? No  Physical Exam  LABS: drawn today  HEALTH RISK ASSESSMENT  Smoking Status:  Social History     Tobacco Use   Smoking Status Never Smoker   Smokeless Tobacco Never Used     Alcohol Consumption:  Social History     Substance and Sexual Activity   Alcohol Use Yes   • Alcohol/week: 4.0 standard drinks   • Types: 4 Cans of beer per week    Comment: Caffeine Use     Fall Risk Screen:  STEADI Fall Risk Assessment has not been completed.  Depression Screening:  PHQ-2/PHQ-9 Depression Screening  6/28/2021   Retired PHQ-9 Total Score 0   Retired Total Score 0     Health Habits and Functional and Cognitive Screening:  Functional & Cognitive Status 8/12/2022   Do you have difficulty preparing food and eating? No   Do you have difficulty bathing yourself, getting dressed or grooming yourself? No   Do you have difficulty using the toilet? No   Do you have difficulty moving around from place to place? No   Do you have trouble with steps or getting out of a bed or a chair? No   Current Diet Well Balanced Diet   Dental Exam Up to date   Eye Exam Up to date   Exercise (times per week) 3 times per week   Current Exercises Include Aerobics   Do you need help using the phone?  No   Are you deaf or do you have serious difficulty hearing?  No   Do you need help with transportation? No   Do you need help shopping? No   Do you need help preparing meals?  No   Do you need help with housework?  No   Do you need help with laundry? No   Do you need help taking your medications? No   Do you need help managing money? No   Do you ever drive or ride in a car without wearing a seat belt? No   Have you felt unusual stress, anger or loneliness in the last month? No   Who do you live with? Spouse   If you need help, do you have trouble finding someone available to you? No   Have you been bothered in the last four weeks by sexual problems? No   Do you have difficulty concentrating, remembering or making decisions? No       Health Habits  Current Diet: Well Balanced Diet  Dental Exam: Up to date  Eye Exam: Up to date  Exercise (times per week): 3 times per week  Current Exercises Include: Aerobics  Age-appropriate Screening Schedule:  Refer to the list below for future screening recommendations based on patient's age, sex and/or medical conditions. Orders for these recommended tests are listed in the plan section. The patient has been provided with a written plan.  Health Maintenance   Topic Date Due   • ZOSTER VACCINE (1 of 2) Never  done   • LIPID PANEL  08/31/2022   • INFLUENZA VACCINE  10/01/2022   • COLONOSCOPY  02/10/2030   • TDAP/TD VACCINES (3 - Td or Tdap) 12/02/2030          Assessment & Plan   CMS Preventative Services Quick Reference  Risk Factors Identified During Encounter  Cardiovascular Disease  Obesity/Overweight      Sees cardiologist in next few weeks. He is working with diet and exercise regarding his weight.   The above risks/problems have been discussed with the patient.  Follow up actions/plans if indicated are seen below in the Assessment/Plan Section.  Pertinent information has been shared with the patient in the After Visit Summary.  Patient Instructions    Problem List Items Addressed This Visit        Cardiac and Vasculature    Benign essential hypertension    Overview     [facility] 8/12/2022  BP is controlled well. He will recheck in six months. He will continue present meds. Prescription will be sent when notified by pharmacy..             Relevant Orders    Comprehensive Metabolic Panel    Hyperlipidemia    Overview     [facility] 8/12/2022 Labs are drawn today          Relevant Orders    Lipid Panel With LDL / HDL Ratio       Mental Health    Situational depression    Overview     Does not like the side effects he is getting with Prozac, including sweating.  He preferred the citalopram and felt that both of them were equally efficient despite his ActX genome evaluation.         Relevant Medications    citalopram (CeleXA) 20 MG tablet      Other Visit Diagnoses     Welcome to Medicare preventive visit    -  Primary    Healthcare maintenance        High risk medication use        Relevant Orders    CBC (No Diff)    Screening for prostate cancer        Relevant Orders    PSA SCREENING             Follow Up:   Return in about 6 months (around 2/12/2023).   An After Visit Summary and PPPS were given/sent to the patient.   05-Dec-2022

## 2022-12-05 NOTE — CHART NOTE - NSCHARTNOTEFT_GEN_A_CORE
To whom it may concern:    The above named patient was admitted to [facility] on 12/4/2022 and discharged on 12/5/2022. Patient may return to work on 12/13/2022. Thank You for your help in this matter.     Anshu Cyr MD  [place] License [id number]  Department of Hospital Medicine   [facility]: [facility]

## 2022-12-05 NOTE — DISCHARGE NOTE NURSING/CASE MANAGEMENT/SOCIAL WORK - NSDCPEFALRISK_GEN_ALL_CORE
For information on Fall & Injury Prevention, visit: [email or web address] OR  [email or web address] OR  https://www.cdc.gov/steadi/patient.html

## 2022-12-05 NOTE — CHART NOTE - NSCHARTNOTEFT_GEN_A_CORE
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 Jose BUITRAGO    RN:     Anesthesia: (MAC)        Sheath:    I/Os/Vitals: see nursing/anesthesia report        Preliminary Report:  Under MAC anesthesia, using a ___Fr short sheath right/ left/ bilateral groin examination of left vertebral artery/ left common carotid artery/ left external carotid artey/ right vertebral artery/ right common carotid artery/ right external carotid artery via selective cerebral angiography demonstrates ________. ( Official note to follow).    Patient tolerated procedure well, vital signs as noted above,  no change in neurological status noted.  Results discussed with   Groin sheath d/c'ed  , manual compression held to hemostasis, no active bleeding, no hematoma, soft throughout, + pedal pulses, angio-seal device applied, quick clot and safeguard balloon dressing applied       Patient transfered to PACU

## 2022-12-05 NOTE — PROGRESS NOTE ADULT - NSPROGADDITIONALINFOA_GEN_ALL_CORE
NSGY Attg:    see above    patient seen and examined by PA staff    agree with exam and plan as above  plan per vascular/endovascular

## 2022-12-05 NOTE — DISCHARGE NOTE PROVIDER - ATTENDING DISCHARGE PHYSICAL EXAMINATION:
PHYSICAL EXAM:  General: NAD, A/O x 3  ENT: MMM, no thrush  Neck: Supple, No JVD  Lungs: Clear to auscultation bilaterally, good air entry, non-labored breathing  Cardio: +s1/s2  Abdomen: Soft, Nontender, Nondistended; Bowel sounds present  Extremities: No calf tenderness

## 2022-12-05 NOTE — DISCHARGE NOTE NURSING/CASE MANAGEMENT/SOCIAL WORK - PATIENT PORTAL LINK FT
You can access the FollowMyHealth Patient Portal offered by [facility] by registering at the following website: [email or web address]. By joining [employer]’s FollowMyHealth portal, you will also be able to view your health information using other applications (apps) compatible with our system.

## 2022-12-05 NOTE — CHART NOTE - NSCHARTNOTEFT_GEN_A_CORE
Interventional Neuro Radiology  Pre-Procedure Note     HPI:  52yF PMH HTN,Depression, CAD s/p NSTMI S/P  LHC, s/p IVUS, moderate proximal LAD 06/08/2020, active smoker  presented to ED complaining of 4-5 days of right sided headache behind her eye/by her right temple, but that morning had 6 episodes, lasting longer than the previous episodes, and more intense,CT head without evidence of hemorrhage, CTA head with 3 mm right ICA terminus aneurysm. Pt is seen by Neurosurgery, recommends LP But pt refused.    PAST MEDICAL HISTORY:  NSTEMI: s/p LHC, s/p IVUS, moderate proximal LAD 06/08/2020  Depressed   Hypertension.   h/o tubal ligation  OA   right heel surgery x 2 for infection,   headache  smoker  S/P foot surgery had surgery x2 to rt foot for abscess  Fibroid S/P hysterectomy   S/P tubal ligation   Vaginal cyst remove.     FAMILY HISTORY:  Family history of cardiac disorder, both parents and all siblings.    Social History:   +smoker 3-4 cigarettes's/day. Alcohol daily- two beers/month.last drink 2 weeks ago.       Neuro Exam: Awake and alert, oriented x3, fluent, normal naming and repetition, follows 3 step commands. Extraocular movements intact, no nystagmus, visual fields full, face symmetric, tongue midline. No drift, 5/5 power x 4 extremities. Normal sensation to LT. Normal finger-to-nose and rapid alternating movements.    PAST MEDICAL & SURGICAL HISTORY:  Hypertension  Depressed  CAD (coronary artery disease)  S/P tubal ligation  S/P hysterectomy  Vaginal cyst  S/P foot surgery  had surgery x2 to rt foot for abscess    FAMILY HISTORY:  Family history of premature CAD (Father)    Allergies: No Known Allergies    Current Medications: acetaminophen     Tablet .. 650 milliGRAM(s) Oral every 6 hours PRN  aluminum hydroxide/magnesium hydroxide/simethicone Suspension 30 milliLiter(s) Oral every 4 hours PRN  amLODIPine   Tablet 10 milliGRAM(s) Oral daily  dextrose 5% + sodium chloride 0.9%. 1000 milliLiter(s) IV Continuous <Continuous>  melatonin 3 milliGRAM(s) Oral at bedtime PRN  nicotine -  14 mG/24Hr(s) Patch 1 Patch Transdermal daily  ondansetron Injectable 4 milliGRAM(s) IV Push every 8 hours PRN  pantoprazole    Tablet 40 milliGRAM(s) Oral before breakfast      Labs:                         12.9   6.60  )-----------( 272      ( 05 Dec 2022 04:54 )             39.0       12-05    142  |  105  |  16.5  ----------------------------<  104<H>  4.1   |  28.0  |  0.66    Ca    9.1      05 Dec 2022 04:54            Blood Bank: 12-04-22  --  B POS  --      Assessment/Plan:   This is a 51y/o women presents with headache x 5 days, found to have 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   Patient presents to neuro-IR for selective cerebral angiography possible embolization after d/w neurosurgical team who is following along.   Procedure/ risks/ benefits/ goals/ alternatives were explained. Risks include but are not limited to stroke/ vessel injury/ hemorrhage/ groin hematoma. All questions answered and concerns addressed. Informed content obtained from  ____ who verbalizes /expresses full understanding. Consent placed in chart. Interventional Neuro Radiology  Pre-Procedure Note     HPI:  52yF PMH HTN,Depression, CAD s/p NSTMI S/P  LHC, s/p IVUS, moderate proximal LAD 06/08/2020, active smoker  presented to ED complaining of 4-5 days of right sided headache behind her eye/by her right temple, but that morning had 6 episodes, lasting longer than the previous episodes, and more intense,CT head without evidence of hemorrhage, CTA head with 3 mm right ICA terminus aneurysm. Pt is seen by Neurosurgery, recommends LP But pt refused.    PAST MEDICAL HISTORY:  NSTEMI: s/p LHC, s/p IVUS, moderate proximal LAD 06/08/2020  Depressed   Hypertension.   h/o tubal ligation  OA   right heel surgery x 2 for infection,   headache  smoker  S/P foot surgery had surgery x2 to rt foot for abscess  Fibroid S/P hysterectomy   S/P tubal ligation   Vaginal cyst remove.     FAMILY HISTORY:  Family history of cardiac disorder, both parents and all siblings.    Social History:   +smoker 3-4 cigarettes's/day. Alcohol daily- two beers/month.last drink 2 weeks ago.       Neuro Exam: Awake and alert, oriented x3, fluent, normal naming and repetition, follows commands. Extraocular movements intact, no nystagmus, visual fields full, face symmetric, tongue midline. No drift, 5/5 power x 4 extremities. Normal sensation to LT. Normal finger-to-nose and rapid alternating movements.    NIH SS:  DATE: 12/5/22     1A: Level of consciousness (0-3):   1B: Questions (0-2):   1C: Commands (0-2):   2: Gaze (0-2):   3: Visual fields (0-3):   4: Facial palsy (0-3):   MOTOR:  5A: Left arm motor drift (0-4):   5B: Right arm motor drift (0-4):   6A: Left leg motor drift (0-4):   6B: Right leg motor drift (0-4):   7: Limb ataxia (0-2):   SENSORY:  8: Sensation (0-2):   SPEECH:  9: Language (0-3):   10: Dysarthria (0-2):   EXTINCTION:  11: Extinction/inattention (0-2):     TOTAL SCORE:     PAST MEDICAL & SURGICAL HISTORY:  Hypertension  Depressed  CAD (coronary artery disease)  S/P tubal ligation  S/P hysterectomy  Vaginal cyst  S/P foot surgery  had surgery x2 to rt foot for abscess    FAMILY HISTORY:  Family history of premature CAD (Father)    Allergies: No Known Allergies    Current Medications: acetaminophen     Tablet .. 650 milliGRAM(s) Oral every 6 hours PRN  aluminum hydroxide/magnesium hydroxide/simethicone Suspension 30 milliLiter(s) Oral every 4 hours PRN  amLODIPine   Tablet 10 milliGRAM(s) Oral daily  dextrose 5% + sodium chloride 0.9%. 1000 milliLiter(s) IV Continuous <Continuous>  melatonin 3 milliGRAM(s) Oral at bedtime PRN  nicotine -  14 mG/24Hr(s) Patch 1 Patch Transdermal daily  ondansetron Injectable 4 milliGRAM(s) IV Push every 8 hours PRN  pantoprazole    Tablet 40 milliGRAM(s) Oral before breakfast      Labs:                         12.9   6.60  )-----------( 272      ( 05 Dec 2022 04:54 )             39.0       12-05    142  |  105  |  16.5  ----------------------------<  104<H>  4.1   |  28.0  |  0.66    Ca    9.1      05 Dec 2022 04:54            Blood Bank: 12-04-22  --  B POS  --      Assessment/Plan:   This is a 53y/o women presents with headache x 5 days, found to have 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   Patient presents to neuro-IR for selective cerebral angiography possible embolization after d/w neurosurgical team who is following along.   Procedure/ risks/ benefits/ goals/ alternatives were explained. Risks include but are not limited to stroke/ vessel injury/ hemorrhage/ groin hematoma. All questions answered and concerns addressed. Informed content obtained from  ____ who verbalizes /expresses full understanding. Consent placed in chart. Interventional Neuro Radiology  Pre-Procedure Note     HPI:  52yF PMH HTN,Depression, CAD s/p NSTMI S/P  LHC, s/p IVUS, moderate proximal LAD 06/08/2020, active smoker  presented to ED complaining of 4-5 days of right sided headache behind her eye/by her right temple, but that morning had 6 episodes, lasting longer than the previous episodes, and more intense,CT head without evidence of hemorrhage, CTA head with 3 mm right ICA terminus aneurysm. Pt is seen by Neurosurgery, recommends LP But pt refused.    PAST MEDICAL HISTORY:  NSTEMI: s/p LHC, s/p IVUS, moderate proximal LAD 06/08/2020  Depressed   Hypertension.   h/o tubal ligation  OA   right heel surgery x 2 for infection,   headache  smoker  S/P foot surgery had surgery x2 to rt foot for abscess  Fibroid S/P hysterectomy   S/P tubal ligation   Vaginal cyst remove.     FAMILY HISTORY:  Family history of cardiac disorder, both parents and all siblings.    Social History:   +smoker 3-4 cigarettes's/day. Alcohol daily- two beers/month.last drink 2 weeks ago.       Neuro Exam: Awake and alert, oriented x3, fluent, normal naming and repetition, follows commands. Extraocular movements intact, no nystagmus, visual fields full, face symmetric, tongue midline. No drift, 5/5 power x 4 extremities. Normal sensation to LT. Normal finger-to-nose and rapid alternating movements.    NIH SS:  DATE: 12/5/22     1A: Level of consciousness (0-3):   1B: Questions (0-2):   1C: Commands (0-2):   2: Gaze (0-2):   3: Visual fields (0-3):   4: Facial palsy (0-3):   MOTOR:  5A: Left arm motor drift (0-4):   5B: Right arm motor drift (0-4):   6A: Left leg motor drift (0-4):   6B: Right leg motor drift (0-4):   7: Limb ataxia (0-2):   SENSORY:  8: Sensation (0-2):   SPEECH:  9: Language (0-3):   10: Dysarthria (0-2):   EXTINCTION:  11: Extinction/inattention (0-2):     TOTAL SCORE: 0    PAST MEDICAL & SURGICAL HISTORY:  Hypertension  Depressed  CAD (coronary artery disease)  S/P tubal ligation  S/P hysterectomy  Vaginal cyst  S/P foot surgery  had surgery x2 to rt foot for abscess    FAMILY HISTORY:  Family history of premature CAD (Father)    Allergies: No Known Allergies    Current Medications: acetaminophen     Tablet .. 650 milliGRAM(s) Oral every 6 hours PRN  aluminum hydroxide/magnesium hydroxide/simethicone Suspension 30 milliLiter(s) Oral every 4 hours PRN  amLODIPine   Tablet 10 milliGRAM(s) Oral daily  dextrose 5% + sodium chloride 0.9%. 1000 milliLiter(s) IV Continuous <Continuous>  melatonin 3 milliGRAM(s) Oral at bedtime PRN  nicotine -  14 mG/24Hr(s) Patch 1 Patch Transdermal daily  ondansetron Injectable 4 milliGRAM(s) IV Push every 8 hours PRN  pantoprazole    Tablet 40 milliGRAM(s) Oral before breakfast      Labs:                         12.9   6.60  )-----------( 272      ( 05 Dec 2022 04:54 )             39.0       12-05    142  |  105  |  16.5  ----------------------------<  104<H>  4.1   |  28.0  |  0.66    Ca    9.1      05 Dec 2022 04:54            Blood Bank: 12-04-22  --  B POS  --      Assessment/Plan:   This is a 51y/o women presents with headache x 5 days, found to have 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   Patient presents to neuro-IR for selective cerebral angiography possible embolization after d/w neurosurgical team who is following along.   Procedure/ risks/ benefits/ goals/ alternatives were explained. Risks include but are not limited to stroke/ vessel injury/ hemorrhage/ groin hematoma. All questions answered and concerns addressed. Informed content obtained from  patient  who verbalizes /expresses full understanding. Consent placed in chart.

## 2022-12-05 NOTE — DISCHARGE NOTE PROVIDER - HOSPITAL COURSE
52y F PMH HTN,Depression, CAD s/p NSTMI S/P  LHC, s/p IVUS, moderate proximal LAD 06/08/2020, active smoker  presents to ED complaining of 4 days of right sided headache. admitted to medicine given ?aneurysm s/p angiogram with neuro ir no aneurysm noted now being d/c in stable condition

## 2022-12-05 NOTE — PROGRESS NOTE ADULT - SUBJECTIVE AND OBJECTIVE BOX
Patient is a 52y old  Female who presents with a chief complaint of headache (05 Dec 2022 11:52)      Patient seen and examined at bedside.    ALLERGIES:  No Known Allergies    MEDICATIONS  (STANDING):  amLODIPine   Tablet 10 milliGRAM(s) Oral daily  dextrose 5% + sodium chloride 0.9%. 1000 milliLiter(s) (65 mL/Hr) IV Continuous <Continuous>  gabapentin 100 milliGRAM(s) Oral three times a day  nicotine -  14 mG/24Hr(s) Patch 1 Patch Transdermal daily  pantoprazole    Tablet 40 milliGRAM(s) Oral before breakfast    MEDICATIONS  (PRN):  acetaminophen     Tablet .. 650 milliGRAM(s) Oral every 6 hours PRN Temp greater or equal to 38C (100.4F), Mild Pain (1 - 3)  aluminum hydroxide/magnesium hydroxide/simethicone Suspension 30 milliLiter(s) Oral every 4 hours PRN Dyspepsia  melatonin 3 milliGRAM(s) Oral at bedtime PRN Insomnia  ondansetron Injectable 4 milliGRAM(s) IV Push every 8 hours PRN Nausea and/or Vomiting    Vital Signs Last 24 Hrs  T(F): 98.3 (05 Dec 2022 11:53), Max: 98.7 (04 Dec 2022 19:24)  HR: 53 (05 Dec 2022 15:30) (53 - 72)  BP: 109/68 (05 Dec 2022 15:30) (94/57 - 133/75)  RR: 14 (05 Dec 2022 15:30) (12 - 20)  SpO2: 97% (05 Dec 2022 15:30) (96% - 99%)  I&O's Summary    PHYSICAL EXAM:  General: NAD, A/O x 3  ENT: MMM, no thrush  Neck: Supple, No JVD  Lungs: Clear to auscultation bilaterally, good air entry, non-labored breathing  Cardio: +s1/s2  Abdomen: Soft, Nontender, Nondistended; Bowel sounds present  Extremities: No calf tenderness     LABS:                        12.9   6.60  )-----------( 272      ( 05 Dec 2022 04:54 )             39.0     12-05    142  |  105  |  16.5  ----------------------------<  104  4.1   |  28.0  |  0.66    Ca    9.1      05 Dec 2022 04:54    PT/INR - ( 05 Dec 2022 04:54 )   PT: 11.3 sec;   INR: 0.97 ratio    PTT - ( 05 Dec 2022 04:54 )  PTT:25.4 sec    CARDIAC MARKERS ( 04 Dec 2022 12:43 )  x     / x     / 81 U/L / x     / x        12-05 Chol 182 mg/dL LDL -- HDL 81 mg/dL Trig 30 mg/dL    RADIOLOGY & ADDITIONAL TESTS:  - no new tests    Care Discussed with Consultants/Other Providers:   Neuro IR

## 2022-12-05 NOTE — DISCHARGE NOTE PROVIDER - NSDCCPCAREPLAN_GEN_ALL_CORE_FT
PRINCIPAL DISCHARGE DIAGNOSIS  Diagnosis: Headache  Assessment and Plan of Treatment: - take medications as rx   - follow up with neurology (IR)  - follow up with primary care doctor  - stop smoking

## 2022-12-05 NOTE — DISCHARGE NOTE PROVIDER - CARE PROVIDER_API CALL
Peter Bah)  Internal Medicine  [address] [place] [address]  Phone: [phone]  Fax: [phone]  Follow Up Time: 1 week    Peter Garcia)  Neurology; Vascular Neurology  [address] [place] [id number]  Phone: [phone]  Fax: [phone]  Follow Up Time: 2 weeks

## 2022-12-05 NOTE — DISCHARGE NOTE PROVIDER - PROVIDER TOKENS
PROVIDER:[TOKEN:[[id number]],FOLLOWUP:[1 week]],PROVIDER:[TOKEN:[[id number]],FOLLOWUP:[2 weeks]]

## 2022-12-05 NOTE — PROGRESS NOTE ADULT - ASSESSMENT
52yF PMH CAD s/p MI 3 years ago with history of heart cath on ASA 81, HTN on amlodipine and compliant, active smoker 5 cigarettes/day x 39 years, h/o tubal ligation ultimately s/p hysterectomy, right heel surgery x 2 for infection, arthritis, presents to ED complaining of 4 days of right sided headache behind her eye/by her right temple, but this morning had 6 episodes, lasting longer than the previous episodes, and more intense  - CT head without evidence of hemorrhage  - CTA head with 3 mm right ICA terminus aneurysm  - MRI brain negative for heme  - MRA neck unremarkable    Plan:  -D/w Dr. Piper  -Q4h neuro checks  -CTH / MR brain negative for hemorrhage   -Cerebral angio this AM with neuro IR   --140  -Pain control PRN  -Further plan pending angio results

## 2022-12-06 ENCOUNTER — NON-APPOINTMENT (OUTPATIENT)
Age: 52
End: 2022-12-06

## 2022-12-12 PROBLEM — I25.10 ATHEROSCLEROTIC HEART DISEASE OF NATIVE CORONARY ARTERY WITHOUT ANGINA PECTORIS: Chronic | Status: ACTIVE | Noted: 2022-12-04

## 2022-12-17 ENCOUNTER — TRANSCRIPTION ENCOUNTER (OUTPATIENT)
Age: 52
End: 2022-12-17

## 2023-01-17 ENCOUNTER — APPOINTMENT (OUTPATIENT)
Dept: NEUROLOGY | Facility: CLINIC | Age: 53
End: 2023-01-17

## 2023-05-08 NOTE — ED PROVIDER NOTE - TEMPLATE, MLM
Detail Level: Generalized Detail Level: Zone Topical Keratolytics Recommendations: Skin Smoothing lotion, Amlactin lotion Detail Level: Detailed VIEW ALL

## 2023-09-20 NOTE — ED ADULT TRIAGE NOTE - HEIGHT IN CM
Every time he pees he says it hurts since yesterday. No fever. Urine looks normal. Penis looks normal. He is drinking normal.  Took apt. 2pm today, drink prior to apt. 162.56

## 2023-11-22 ENCOUNTER — EMERGENCY (EMERGENCY)
Facility: HOSPITAL | Age: 53
LOS: 1 days | Discharge: HOME | End: 2023-11-22
Attending: EMERGENCY MEDICINE
Payer: COMMERCIAL

## 2023-11-22 VITALS
DIASTOLIC BLOOD PRESSURE: 82 MMHG | TEMPERATURE: 100 F | SYSTOLIC BLOOD PRESSURE: 135 MMHG | OXYGEN SATURATION: 96 % | RESPIRATION RATE: 18 BRPM | WEIGHT: 119.71 LBS | HEART RATE: 108 BPM

## 2023-11-22 VITALS
RESPIRATION RATE: 18 BRPM | TEMPERATURE: 98 F | SYSTOLIC BLOOD PRESSURE: 145 MMHG | HEART RATE: 80 BPM | OXYGEN SATURATION: 98 % | DIASTOLIC BLOOD PRESSURE: 80 MMHG

## 2023-11-22 DIAGNOSIS — Z98.890 OTHER SPECIFIED POSTPROCEDURAL STATES: Chronic | ICD-10-CM

## 2023-11-22 DIAGNOSIS — N89.8 OTHER SPECIFIED NONINFLAMMATORY DISORDERS OF VAGINA: Chronic | ICD-10-CM

## 2023-11-22 DIAGNOSIS — Z90.710 ACQUIRED ABSENCE OF BOTH CERVIX AND UTERUS: Chronic | ICD-10-CM

## 2023-11-22 DIAGNOSIS — Z98.51 TUBAL LIGATION STATUS: Chronic | ICD-10-CM

## 2023-11-22 LAB
RAPID RVP RESULT: DETECTED
RAPID RVP RESULT: DETECTED
SARS-COV-2 RNA SPEC QL NAA+PROBE: DETECTED
SARS-COV-2 RNA SPEC QL NAA+PROBE: DETECTED

## 2023-11-22 PROCEDURE — 99284 EMERGENCY DEPT VISIT MOD MDM: CPT

## 2023-11-22 PROCEDURE — 71046 X-RAY EXAM CHEST 2 VIEWS: CPT | Mod: 26

## 2023-11-22 PROCEDURE — 96372 THER/PROPH/DIAG INJ SC/IM: CPT

## 2023-11-22 PROCEDURE — 99283 EMERGENCY DEPT VISIT LOW MDM: CPT | Mod: 25

## 2023-11-22 PROCEDURE — 71046 X-RAY EXAM CHEST 2 VIEWS: CPT

## 2023-11-22 PROCEDURE — 0225U NFCT DS DNA&RNA 21 SARSCOV2: CPT

## 2023-11-22 RX ORDER — KETOROLAC TROMETHAMINE 30 MG/ML
30 SYRINGE (ML) INJECTION ONCE
Refills: 0 | Status: DISCONTINUED | OUTPATIENT
Start: 2023-11-22 | End: 2023-11-22

## 2023-11-22 RX ORDER — ACETAMINOPHEN 500 MG
975 TABLET ORAL ONCE
Refills: 0 | Status: COMPLETED | OUTPATIENT
Start: 2023-11-22 | End: 2023-11-22

## 2023-11-22 RX ORDER — IBUPROFEN 200 MG
600 TABLET ORAL ONCE
Refills: 0 | Status: DISCONTINUED | OUTPATIENT
Start: 2023-11-22 | End: 2023-11-22

## 2023-11-22 RX ADMIN — Medication 30 MILLIGRAM(S): at 21:21

## 2023-11-22 RX ADMIN — Medication 975 MILLIGRAM(S): at 21:20

## 2023-11-22 NOTE — ED PROVIDER NOTE - PATIENT PORTAL LINK FT
You can access the FollowMyHealth Patient Portal offered by [facility] by registering at the following website: [email or web address]. By joining [employer]’s FollowMyHealth portal, you will also be able to view your health information using other applications (apps) compatible with our system.

## 2023-11-22 NOTE — ED PROVIDER NOTE - PHYSICAL EXAMINATION
Gen: No acute distress, non toxic  HEENT: Mucous membranes moist, pink conjunctivae, EOMI. +Rhinorrhea  CV: RRR, nl s1/s2.  Resp: CTAB, normal rate and effort  GI: Abdomen soft, NT, ND. No rebound, no guarding  : No CVAT  Neuro: A&O x 3, moving all 4 extremities  MSK: No spine or joint tenderness to palpation  Skin: No rashes. intact and perfused.

## 2023-11-22 NOTE — ED ADULT NURSE NOTE - OBJECTIVE STATEMENT
Assumed care of patient in consult krause. Pt a&ox4 rr even and unlabored with pmhx of htn, mi presents to ed with c/o of fever, chills, body aches and nasal congestion since today. Pt also reports productive cough x1-2 weeks. Denies chest pain, sob, gu/gi symptoms, visual changes, weakness. pt educated on plan of care, pt able to successfully teach back plan of care to RN, RN will continue to reeducate pt during hospital stay.

## 2023-11-22 NOTE — ED PROVIDER NOTE - CLINICAL SUMMARY MEDICAL DECISION MAKING FREE TEXT BOX
52 yo F hx HTN, MI presents to ER c/o fever/chills, body aches, headache, rhinorrhea started today. pt also notes cough with phlegm the last 1-2 weeks. Low grade fever in ED. Lungs CTA. Viral URI versus PNA. Will obtain RVP, CXR, medicate and reassess. 54 yo F hx HTN, MI presents to ER c/o fever/chills, body aches, headache, rhinorrhea started today. pt also notes cough with phlegm the last 1-2 weeks. Low grade fever in ED. Lungs CTA. Viral URI versus PNA. Will obtain RVP, CXR, medicate and reassess.    CXR clear. Sx improving with meds. Likely viral URI, advised on supportive care and will contact if swab positive

## 2023-11-22 NOTE — ED PROVIDER NOTE - OBJECTIVE STATEMENT
52 yo F hx HTN, MI presents to ER c/o fever/chills, body aches, headache, rhinorrhea started today. pt also notes cough with phlegm the last 1-2 weeks, states her grandson had initially gotten her sick but she has multiple sick contacts at work as well. denies N/V/D, CP, SOB, throat pain. No meds taken PTA.

## 2023-11-22 NOTE — ED ADULT NURSE NOTE - NSFALLUNIVINTERV_ED_ALL_ED
Bed/Stretcher in lowest position, wheels locked, appropriate side rails in place/Call bell, personal items and telephone in reach/Instruct patient to call for assistance before getting out of bed/chair/stretcher/Non-slip footwear applied when patient is off stretcher/Dazey to call system/Physically safe environment - no spills, clutter or unnecessary equipment/Purposeful proactive rounding/Room/bathroom lighting operational, light cord in reach

## 2023-11-22 NOTE — ED PROVIDER NOTE - NSFOLLOWUPINSTRUCTIONS_ED_ALL_ED_FT
Please take ibuprofen 600mg every 6 hours as needed for pain or fever  Please take tylenol 650mg every 6hours as needed for pain or fever  Follow up with primary care doctor in 2-3 days  Return to ER for new or worsening symptoms    Viral Respiratory Infection    A viral respiratory infection is an illness that affects parts of the body used for breathing, like the lungs, nose, and throat. It is caused by a germ called a virus. Symptoms can include runny nose, coughing, sneezing, fatigue, body aches, sore throat, fever, or headache. Over the counter medicine can be used to manage the symptoms but the infection typically goes away on its own in 5 to 10 days.     SEEK IMMEDIATE MEDICAL CARE IF YOU HAVE ANY OF THE FOLLOWING SYMPTOMS: shortness of breath, chest pain, fever over 10 days, or lightheadedness/dizziness.

## 2023-11-22 NOTE — ED PROVIDER NOTE - ATTENDING APP SHARED VISIT CONTRIBUTION OF CARE
The patient discussed with ACP    Viral syndrome    I, Sp Ponce, performed the initial face to face bedside interview with this patient regarding history of present illness, review of symptoms and relevant past medical, social and family history.  I completed an independent physical examination.  I was the initial provider who evaluated this patient. I have signed out the follow up of any pending tests (i.e. labs, radiological studies) to the ACP.  I have communicated the patient’s plan of care and disposition with the ACP.

## 2024-02-09 ENCOUNTER — EMERGENCY (EMERGENCY)
Facility: HOSPITAL | Age: 54
LOS: 1 days | Discharge: HOME | End: 2024-02-09
Attending: EMERGENCY MEDICINE
Payer: COMMERCIAL

## 2024-02-09 VITALS
RESPIRATION RATE: 18 BRPM | OXYGEN SATURATION: 99 % | HEART RATE: 82 BPM | SYSTOLIC BLOOD PRESSURE: 161 MMHG | DIASTOLIC BLOOD PRESSURE: 90 MMHG

## 2024-02-09 VITALS
HEART RATE: 123 BPM | DIASTOLIC BLOOD PRESSURE: 90 MMHG | SYSTOLIC BLOOD PRESSURE: 160 MMHG | RESPIRATION RATE: 20 BRPM | HEIGHT: 67 IN | OXYGEN SATURATION: 99 % | WEIGHT: 125 LBS | TEMPERATURE: 98 F

## 2024-02-09 DIAGNOSIS — Z98.51 TUBAL LIGATION STATUS: Chronic | ICD-10-CM

## 2024-02-09 DIAGNOSIS — Z98.890 OTHER SPECIFIED POSTPROCEDURAL STATES: Chronic | ICD-10-CM

## 2024-02-09 DIAGNOSIS — N89.8 OTHER SPECIFIED NONINFLAMMATORY DISORDERS OF VAGINA: Chronic | ICD-10-CM

## 2024-02-09 DIAGNOSIS — Z90.710 ACQUIRED ABSENCE OF BOTH CERVIX AND UTERUS: Chronic | ICD-10-CM

## 2024-02-09 LAB
ALBUMIN SERPL ELPH-MCNC: 4.3 G/DL — SIGNIFICANT CHANGE UP (ref 3.3–5.2)
ALP SERPL-CCNC: 118 U/L — SIGNIFICANT CHANGE UP (ref 40–120)
ALT FLD-CCNC: 9 U/L — SIGNIFICANT CHANGE UP
ANION GAP SERPL CALC-SCNC: 11 MMOL/L — SIGNIFICANT CHANGE UP (ref 5–17)
APTT BLD: 28.1 SEC — SIGNIFICANT CHANGE UP (ref 24.5–35.6)
AST SERPL-CCNC: 19 U/L — SIGNIFICANT CHANGE UP
BASOPHILS # BLD AUTO: 0.03 K/UL — SIGNIFICANT CHANGE UP (ref 0–0.2)
BASOPHILS NFR BLD AUTO: 0.3 % — SIGNIFICANT CHANGE UP (ref 0–2)
BILIRUB SERPL-MCNC: 1 MG/DL — SIGNIFICANT CHANGE UP (ref 0.4–2)
BUN SERPL-MCNC: 18.2 MG/DL — SIGNIFICANT CHANGE UP (ref 8–20)
CALCIUM SERPL-MCNC: 9.6 MG/DL — SIGNIFICANT CHANGE UP (ref 8.4–10.5)
CHLORIDE SERPL-SCNC: 103 MMOL/L — SIGNIFICANT CHANGE UP (ref 96–108)
CO2 SERPL-SCNC: 27 MMOL/L — SIGNIFICANT CHANGE UP (ref 22–29)
CREAT SERPL-MCNC: 0.68 MG/DL — SIGNIFICANT CHANGE UP (ref 0.5–1.3)
EGFR: 104 ML/MIN/1.73M2 — SIGNIFICANT CHANGE UP
EOSINOPHIL # BLD AUTO: 0.03 K/UL — SIGNIFICANT CHANGE UP (ref 0–0.5)
EOSINOPHIL NFR BLD AUTO: 0.3 % — SIGNIFICANT CHANGE UP (ref 0–6)
GLUCOSE SERPL-MCNC: 85 MG/DL — SIGNIFICANT CHANGE UP (ref 70–99)
HCT VFR BLD CALC: 42.8 % — SIGNIFICANT CHANGE UP (ref 34.5–45)
HGB BLD-MCNC: 14.4 G/DL — SIGNIFICANT CHANGE UP (ref 11.5–15.5)
IMM GRANULOCYTES NFR BLD AUTO: 0.1 % — SIGNIFICANT CHANGE UP (ref 0–0.9)
INR BLD: 0.9 RATIO — SIGNIFICANT CHANGE UP (ref 0.85–1.18)
LYMPHOCYTES # BLD AUTO: 2.64 K/UL — SIGNIFICANT CHANGE UP (ref 1–3.3)
LYMPHOCYTES # BLD AUTO: 30.1 % — SIGNIFICANT CHANGE UP (ref 13–44)
MCHC RBC-ENTMCNC: 29.4 PG — SIGNIFICANT CHANGE UP (ref 27–34)
MCHC RBC-ENTMCNC: 33.6 GM/DL — SIGNIFICANT CHANGE UP (ref 32–36)
MCV RBC AUTO: 87.5 FL — SIGNIFICANT CHANGE UP (ref 80–100)
MONOCYTES # BLD AUTO: 0.75 K/UL — SIGNIFICANT CHANGE UP (ref 0–0.9)
MONOCYTES NFR BLD AUTO: 8.6 % — SIGNIFICANT CHANGE UP (ref 2–14)
NEUTROPHILS # BLD AUTO: 5.3 K/UL — SIGNIFICANT CHANGE UP (ref 1.8–7.4)
NEUTROPHILS NFR BLD AUTO: 60.6 % — SIGNIFICANT CHANGE UP (ref 43–77)
PLATELET # BLD AUTO: 225 K/UL — SIGNIFICANT CHANGE UP (ref 150–400)
POTASSIUM SERPL-MCNC: 4.4 MMOL/L — SIGNIFICANT CHANGE UP (ref 3.5–5.3)
POTASSIUM SERPL-SCNC: 4.4 MMOL/L — SIGNIFICANT CHANGE UP (ref 3.5–5.3)
PROT SERPL-MCNC: 7.5 G/DL — SIGNIFICANT CHANGE UP (ref 6.6–8.7)
PROTHROM AB SERPL-ACNC: 10 SEC — SIGNIFICANT CHANGE UP (ref 9.5–13)
RBC # BLD: 4.89 M/UL — SIGNIFICANT CHANGE UP (ref 3.8–5.2)
RBC # FLD: 14.4 % — SIGNIFICANT CHANGE UP (ref 10.3–14.5)
SODIUM SERPL-SCNC: 141 MMOL/L — SIGNIFICANT CHANGE UP (ref 135–145)
TROPONIN T, HIGH SENSITIVITY RESULT: <6 NG/L — SIGNIFICANT CHANGE UP (ref 0–51)
WBC # BLD: 8.76 K/UL — SIGNIFICANT CHANGE UP (ref 3.8–10.5)
WBC # FLD AUTO: 8.76 K/UL — SIGNIFICANT CHANGE UP (ref 3.8–10.5)

## 2024-02-09 PROCEDURE — 80053 COMPREHEN METABOLIC PANEL: CPT

## 2024-02-09 PROCEDURE — 36415 COLL VENOUS BLD VENIPUNCTURE: CPT

## 2024-02-09 PROCEDURE — 99285 EMERGENCY DEPT VISIT HI MDM: CPT

## 2024-02-09 PROCEDURE — 84484 ASSAY OF TROPONIN QUANT: CPT

## 2024-02-09 PROCEDURE — 93005 ELECTROCARDIOGRAM TRACING: CPT

## 2024-02-09 PROCEDURE — 85025 COMPLETE CBC W/AUTO DIFF WBC: CPT

## 2024-02-09 PROCEDURE — 85730 THROMBOPLASTIN TIME PARTIAL: CPT

## 2024-02-09 PROCEDURE — 71045 X-RAY EXAM CHEST 1 VIEW: CPT | Mod: 26

## 2024-02-09 PROCEDURE — 96374 THER/PROPH/DIAG INJ IV PUSH: CPT

## 2024-02-09 PROCEDURE — 99285 EMERGENCY DEPT VISIT HI MDM: CPT | Mod: 25

## 2024-02-09 PROCEDURE — 85610 PROTHROMBIN TIME: CPT

## 2024-02-09 PROCEDURE — 93010 ELECTROCARDIOGRAM REPORT: CPT

## 2024-02-09 PROCEDURE — 71045 X-RAY EXAM CHEST 1 VIEW: CPT

## 2024-02-09 RX ORDER — CYCLOBENZAPRINE HYDROCHLORIDE 10 MG/1
1 TABLET, FILM COATED ORAL
Qty: 15 | Refills: 0
Start: 2024-02-09 | End: 2024-02-13

## 2024-02-09 RX ORDER — LIDOCAINE 4 G/100G
1 CREAM TOPICAL ONCE
Refills: 0 | Status: COMPLETED | OUTPATIENT
Start: 2024-02-09 | End: 2024-02-09

## 2024-02-09 RX ORDER — KETOROLAC TROMETHAMINE 30 MG/ML
30 SYRINGE (ML) INJECTION ONCE
Refills: 0 | Status: DISCONTINUED | OUTPATIENT
Start: 2024-02-09 | End: 2024-02-09

## 2024-02-09 RX ORDER — CYCLOBENZAPRINE HYDROCHLORIDE 10 MG/1
10 TABLET, FILM COATED ORAL ONCE
Refills: 0 | Status: COMPLETED | OUTPATIENT
Start: 2024-02-09 | End: 2024-02-09

## 2024-02-09 RX ORDER — LIDOCAINE 4 G/100G
1 CREAM TOPICAL
Qty: 1 | Refills: 0
Start: 2024-02-09 | End: 2024-02-13

## 2024-02-09 RX ADMIN — Medication 30 MILLIGRAM(S): at 11:30

## 2024-02-09 RX ADMIN — CYCLOBENZAPRINE HYDROCHLORIDE 10 MILLIGRAM(S): 10 TABLET, FILM COATED ORAL at 11:30

## 2024-02-09 RX ADMIN — LIDOCAINE 1 PATCH: 4 CREAM TOPICAL at 11:30

## 2024-02-09 NOTE — ED ADULT NURSE NOTE - OBJECTIVE STATEMENT
Pt A&Ox4, rr even and unlabored on RA. Pt c/o of right sided chest pain that started last night and does not radiate anywhere else. She states that the pain is worse with activity. PMHx CAD, NSTEMI, HTN, depression, heart cath. Denies SOB, N/V, numbness/tingling. Medicated per orders. NSR on monitor.

## 2024-02-09 NOTE — ED PROVIDER NOTE - CLINICAL SUMMARY MEDICAL DECISION MAKING FREE TEXT BOX
53-year-old female with history of CAD, NSTEMI status post left heart cath, depression, HTN presenting with right chest pain.   Patient's chest pain is worse with palpation, movement does not appear to be exertional in nature.  She reports that it feels different from the chest pain she had when she had her NSTEMI.   Denies shortness of breath.  Multiple EKG with sinus tach without acute ST changes.  She is currently only has a normal heart rate while sitting in the stretcher.  will check labs, troponin, chest x-ray to eval for PTX, ACS. Will give meds for presumed musculoskeletal nature of chest pain.

## 2024-02-09 NOTE — ED PROVIDER NOTE - PROGRESS NOTE DETAILS
The patient has right sided chest wall pain worsening with certain movement and by palpation and trop negative and currently feels well without any complaints and will dc home and follow up with PMD

## 2024-02-09 NOTE — ED PROVIDER NOTE - ATTENDING CONTRIBUTION TO CARE
The patient see with resident    Right sided chest wall pain    I, Sp Ponce, performed the initial face to face bedside interview with this patient regarding history of present illness, review of symptoms and relevant past medical, social and family history.  I completed an independent physical examination.  I was the initial provider who evaluated this patient. I have signed out the follow up of any pending tests (i.e. labs, radiological studies) to the resident.  I have communicated the patient’s plan of care and disposition with the resident

## 2024-02-09 NOTE — ED PROVIDER NOTE - PHYSICAL EXAMINATION
Gen: no acute distress  Head: normocephalic, atraumatic  EENT: EOMI  Lung: no increased work of breathing, CTABL  CV: normal s1/s2, rrr, 2+ radial pulses b/l, no LE edema  Abd: soft, non-tender, non-distended, no rebound tenderness or guarding  MSK: no visible deformities, full range of motion in all 4 extremities; +TTP over right anterior chest wall  Neuro: A&Ox4; No focal neurologic deficits

## 2024-02-09 NOTE — ED ADULT NURSE NOTE - CAS DISCH BELONGINGS RETURNED
Not applicable Acitretin Counseling:  I discussed with the patient the risks of acitretin including but not limited to hair loss, dry lips/skin/eyes, liver damage, hyperlipidemia, depression/suicidal ideation, photosensitivity.  Serious rare side effects can include but are not limited to pancreatitis, pseudotumor cerebri, bony changes, clot formation/stroke/heart attack.  Patient understands that alcohol is contraindicated since it can result in liver toxicity and significantly prolong the elimination of the drug by many years.

## 2024-02-09 NOTE — ED PROVIDER NOTE - PATIENT PORTAL LINK FT
You can access the FollowMyHealth Patient Portal offered by [facility] by registering at the following website: [email or web address]. By joining [employer]’s FollowMyHealth portal, you will also be able to view your health information using other applications (apps) compatible with our system.

## 2024-02-09 NOTE — ED PROVIDER NOTE - OBJECTIVE STATEMENT
53-year-old female with history of CAD, NSTEMI status post left heart cath, depression, HTN presenting with right chest pain.  Patient endorses  sudden onset right-sided chest pain that is worse with movement and when she lifts up her right arm.  Denies recent trauma or upper body exertion.  Denies pain is worse with exertion but is severely worse with movement.  No shortness of breath, nausea, vomiting, diaphoresis.  Patient reportedly has a history of CAD but has not had a stent and is not currently on antiplatelet therapy/ac per patient?   Denies fever, leg pain, leg swelling, recent travel, recent prolonged immobility, recent hospitalization, abdominal pain, back pain.

## 2024-04-15 NOTE — ED PROVIDER NOTE - CARDIOVASCULAR NEGATIVE STATEMENT, MLM
Called home #, spoke with Abbi.  Informed of [facility] referral received from Annie Mendez APNP. Discussed [facility] program at length. Abbi voiced understanding and agreed to services.    Verified patient info and address.    SCHEDULED 5/3/24 @930AM    Informed APC will f/u to further assess.  [facility]  Gloria Blankenship assigned to eval/ care. Abbi voiced understanding.    normal rate and rhythm, no chest pain and no edema.

## 2024-06-07 NOTE — ED ADULT NURSE NOTE - PRIMARY CARE PROVIDER
dr. tompkins [Mother] : mother [Yes] : Patient goes to dentist yearly [Up to date] : Up to date [Eats meals with family] : eats meals with family [Has family members/adults to turn to for help] : has family members/adults to turn to for help [Is permitted and is able to make independent decisions] : Is permitted and is able to make independent decisions [Grade: ____] : Grade: [unfilled] [Normal Performance] : normal performance [Normal Behavior/Attention] : normal behavior/attention [Has friends] : has friends [At least 1 hour of physical activity a day] : at least 1 hour of physical activity a day [Uses safety belts/safety equipment] : uses safety belts/safety equipment  [No] : Patient has not had sexual intercourse [Has ways to cope with stress] : has ways to cope with stress [Displays self-confidence] : displays self-confidence [With Teen] : teen [NO] : No [Eats regular meals including adequate fruits and vegetables] : eats regular meals including adequate fruits and vegetables [Drinks non-sweetened liquids] : drinks non-sweetened liquids  [Uses electronic nicotine delivery system] : does not use electronic nicotine delivery system [Uses tobacco] : does not use tobacco [Uses drugs] : does not use drugs  [Drinks alcohol] : does not drink alcohol [Gets depressed, anxious, or irritable/has mood swings] : does not get depressed, anxious, or irritable/has mood swings [Has thought about hurting self or considered suicide] : has not thought about hurting self or considered suicide [FreeTextEntry1] : doing well

## 2024-10-20 ENCOUNTER — EMERGENCY (EMERGENCY)
Facility: HOSPITAL | Age: 54
LOS: 1 days | Discharge: HOME | End: 2024-10-20
Attending: EMERGENCY MEDICINE
Payer: COMMERCIAL

## 2024-10-20 VITALS
OXYGEN SATURATION: 100 % | HEIGHT: 67 IN | DIASTOLIC BLOOD PRESSURE: 122 MMHG | RESPIRATION RATE: 18 BRPM | WEIGHT: 122.36 LBS | TEMPERATURE: 99 F | HEART RATE: 149 BPM | SYSTOLIC BLOOD PRESSURE: 199 MMHG

## 2024-10-20 DIAGNOSIS — Z90.710 ACQUIRED ABSENCE OF BOTH CERVIX AND UTERUS: Chronic | ICD-10-CM

## 2024-10-20 DIAGNOSIS — I20.0 UNSTABLE ANGINA: ICD-10-CM

## 2024-10-20 DIAGNOSIS — Z98.890 OTHER SPECIFIED POSTPROCEDURAL STATES: Chronic | ICD-10-CM

## 2024-10-20 DIAGNOSIS — Z98.51 TUBAL LIGATION STATUS: Chronic | ICD-10-CM

## 2024-10-20 DIAGNOSIS — N89.8 OTHER SPECIFIED NONINFLAMMATORY DISORDERS OF VAGINA: Chronic | ICD-10-CM

## 2024-10-20 LAB
ALBUMIN SERPL ELPH-MCNC: 4.4 G/DL — SIGNIFICANT CHANGE UP (ref 3.3–5.2)
ALP SERPL-CCNC: 100 U/L — SIGNIFICANT CHANGE UP (ref 40–120)
ALT FLD-CCNC: 15 U/L — SIGNIFICANT CHANGE UP
ANION GAP SERPL CALC-SCNC: 17 MMOL/L — SIGNIFICANT CHANGE UP (ref 5–17)
APTT BLD: 27.1 SEC — SIGNIFICANT CHANGE UP (ref 24.5–35.6)
AST SERPL-CCNC: 31 U/L — SIGNIFICANT CHANGE UP
BASOPHILS # BLD AUTO: 0 K/UL — SIGNIFICANT CHANGE UP (ref 0–0.2)
BASOPHILS NFR BLD AUTO: 0 % — SIGNIFICANT CHANGE UP (ref 0–2)
BILIRUB SERPL-MCNC: 1.1 MG/DL — SIGNIFICANT CHANGE UP (ref 0.4–2)
BUN SERPL-MCNC: 12.8 MG/DL — SIGNIFICANT CHANGE UP (ref 8–20)
CALCIUM SERPL-MCNC: 8.6 MG/DL — SIGNIFICANT CHANGE UP (ref 8.4–10.5)
CHLORIDE SERPL-SCNC: 101 MMOL/L — SIGNIFICANT CHANGE UP (ref 96–108)
CO2 SERPL-SCNC: 21 MMOL/L — LOW (ref 22–29)
CREAT SERPL-MCNC: 0.72 MG/DL — SIGNIFICANT CHANGE UP (ref 0.5–1.3)
EGFR: 99 ML/MIN/1.73M2 — SIGNIFICANT CHANGE UP
EOSINOPHIL # BLD AUTO: 0.08 K/UL — SIGNIFICANT CHANGE UP (ref 0–0.5)
EOSINOPHIL NFR BLD AUTO: 0.9 % — SIGNIFICANT CHANGE UP (ref 0–6)
GLUCOSE SERPL-MCNC: 117 MG/DL — HIGH (ref 70–99)
HCT VFR BLD CALC: 40.5 % — SIGNIFICANT CHANGE UP (ref 34.5–45)
HGB BLD-MCNC: 13.3 G/DL — SIGNIFICANT CHANGE UP (ref 11.5–15.5)
INR BLD: 0.9 RATIO — SIGNIFICANT CHANGE UP (ref 0.85–1.16)
LYMPHOCYTES # BLD AUTO: 2.64 K/UL — SIGNIFICANT CHANGE UP (ref 1–3.3)
LYMPHOCYTES # BLD AUTO: 28.9 % — SIGNIFICANT CHANGE UP (ref 13–44)
MANUAL SMEAR VERIFICATION: SIGNIFICANT CHANGE UP
MCHC RBC-ENTMCNC: 28.9 PG — SIGNIFICANT CHANGE UP (ref 27–34)
MCHC RBC-ENTMCNC: 32.8 GM/DL — SIGNIFICANT CHANGE UP (ref 32–36)
MCV RBC AUTO: 87.9 FL — SIGNIFICANT CHANGE UP (ref 80–100)
MONOCYTES # BLD AUTO: 0.8 K/UL — SIGNIFICANT CHANGE UP (ref 0–0.9)
MONOCYTES NFR BLD AUTO: 8.8 % — SIGNIFICANT CHANGE UP (ref 2–14)
NEUTROPHILS # BLD AUTO: 4.56 K/UL — SIGNIFICANT CHANGE UP (ref 1.8–7.4)
NEUTROPHILS NFR BLD AUTO: 50 % — SIGNIFICANT CHANGE UP (ref 43–77)
PLAT MORPH BLD: NORMAL — SIGNIFICANT CHANGE UP
PLATELET # BLD AUTO: 207 K/UL — SIGNIFICANT CHANGE UP (ref 150–400)
POTASSIUM SERPL-MCNC: 3.2 MMOL/L — LOW (ref 3.5–5.3)
POTASSIUM SERPL-SCNC: 3.2 MMOL/L — LOW (ref 3.5–5.3)
PROT SERPL-MCNC: 7.6 G/DL — SIGNIFICANT CHANGE UP (ref 6.6–8.7)
PROTHROM AB SERPL-ACNC: 10.2 SEC — SIGNIFICANT CHANGE UP (ref 9.9–13.4)
RBC # BLD: 4.61 M/UL — SIGNIFICANT CHANGE UP (ref 3.8–5.2)
RBC # FLD: 13.9 % — SIGNIFICANT CHANGE UP (ref 10.3–14.5)
RBC BLD AUTO: NORMAL — SIGNIFICANT CHANGE UP
SODIUM SERPL-SCNC: 139 MMOL/L — SIGNIFICANT CHANGE UP (ref 135–145)
TROPONIN T, HIGH SENSITIVITY RESULT: <6 NG/L — SIGNIFICANT CHANGE UP (ref 0–51)
TROPONIN T, HIGH SENSITIVITY RESULT: <6 NG/L — SIGNIFICANT CHANGE UP (ref 0–51)
VARIANT LYMPHS # BLD: 11.4 % — HIGH (ref 0–6)
WBC # BLD: 9.12 K/UL — SIGNIFICANT CHANGE UP (ref 3.8–10.5)
WBC # FLD AUTO: 9.12 K/UL — SIGNIFICANT CHANGE UP (ref 3.8–10.5)

## 2024-10-20 PROCEDURE — 71045 X-RAY EXAM CHEST 1 VIEW: CPT | Mod: 26

## 2024-10-20 PROCEDURE — 93010 ELECTROCARDIOGRAM REPORT: CPT | Mod: 77

## 2024-10-20 PROCEDURE — 93306 TTE W/DOPPLER COMPLETE: CPT | Mod: 26

## 2024-10-20 PROCEDURE — 93010 ELECTROCARDIOGRAM REPORT: CPT

## 2024-10-20 PROCEDURE — 99284 EMERGENCY DEPT VISIT MOD MDM: CPT | Mod: 25

## 2024-10-20 PROCEDURE — 99223 1ST HOSP IP/OBS HIGH 75: CPT

## 2024-10-20 RX ORDER — AMLODIPINE BESYLATE 5 MG
10 TABLET ORAL ONCE
Refills: 0 | Status: COMPLETED | OUTPATIENT
Start: 2024-10-20 | End: 2024-10-20

## 2024-10-20 RX ORDER — AMLODIPINE BESYLATE 5 MG
10 TABLET ORAL DAILY
Refills: 0 | Status: DISCONTINUED | OUTPATIENT
Start: 2024-10-21 | End: 2024-10-28

## 2024-10-20 RX ADMIN — Medication 1 MILLIGRAM(S): at 12:36

## 2024-10-20 RX ADMIN — Medication 10 MILLIGRAM(S): at 12:36

## 2024-10-20 RX ADMIN — Medication 40 MILLIEQUIVALENT(S): at 13:31

## 2024-10-20 NOTE — ED CDU PROVIDER INITIAL DAY NOTE - NS ED ROS FT
No fever/chills, No photophobia/eye pain/changes in vision, No ear pain/sore throat/dysphagia, +chest pain/-palpitations, no SOB/cough/wheeze/stridor, No abdominal pain, No N/V/D, no dysuria/frequency/discharge, No neck/back pain, no rash, no changes in neurological status/function.

## 2024-10-20 NOTE — CONSULT NOTE ADULT - ASSESSMENT
This is a 54 year old female w/PMHx anxiety, HTN, CAD, NSTEMI s/p [facility] june 2022 presents to the ED with chest pain. Patient states that it started yesterday while getting ready for work. Pain persisted all night followed by SOB 2 hours ago. Reports she used to take amlodipine 10mg however 2 months ago ran out of her meds. Denies any headache, vision changes, numbness, tingling, weakness, swelling. No recent travel. No fevers, chills, nausea, vomiting.   This is a 54 year old female w/PMHx anxiety, HTN, CAD, NSTEMI s/p [facility] june 2022 presents to the ED with chest pain. Patient states that it started yesterday while getting ready for work. Pain persisted all night followed by SOB 2 hours ago. Reports she used to take amlodipine 10mg however 2 months ago ran out of her meds. Denies any headache, vision changes, numbness, tingling, weakness, swelling. No recent travel. No fevers, chills, nausea, vomiting.    < from: Cardiac Cath Lab - Adult (06.08.20 @ 08:16) >  Angiographic Findings     Cardiac Arteries and Lesion Findings    LMCA: Normal.    LAD: Abnormal.There is a 50% stenosis noted in the ostial portion of the  vessel.     Prox LAD: Ostial.50% stenosis.    LCx: Normal.    RCA: Normal.    < end of copied text >

## 2024-10-20 NOTE — ED CDU PROVIDER INITIAL DAY NOTE - CLINICAL SUMMARY MEDICAL DECISION MAKING FREE TEXT BOX
chest pain with risk factors  seen by [facility] cardiology  reccomends echo, calcium CT and NST 10/21  c/w cardiac monitor  serial trops/ekg  dash diet  activity ambulate as tolerated  VS q 4

## 2024-10-20 NOTE — ED CDU PROVIDER INITIAL DAY NOTE - PHYSICAL EXAMINATION
Constitutional - well-developed; well nourished. Head - NCAT. Airway patent.  Neuro - A&Ox3. Skin - No rash. MSK - normal ROM.

## 2024-10-20 NOTE — ED ADULT NURSE REASSESSMENT NOTE - NS ED NURSE REASSESS COMMENT FT1
Report given to Donna PERRY. Pt placed in main ED B15L, placed on tele monitor/spo2/blood pressure cuff

## 2024-10-20 NOTE — ED PROVIDER NOTE - PHYSICAL EXAMINATION
Gen: AOx3  Head: NCAT  HEENT: EOMI, oral mucosa moist, normal conjunctiva, neck supple  Lung: clear b/l  CV: tachycardic, normal perfusion  Abd: soft, NTND  MSK: No edema, no visible deformities  Neuro: No focal neurologic deficits  Skin: No rash   Psych: anxious

## 2024-10-20 NOTE — ED CDU PROVIDER INITIAL DAY NOTE - ATTENDING APP SHARED VISIT CONTRIBUTION OF CARE
I, Sp Ponce, performed the initial face to face bedside interview with this patient regarding history of present illness, review of symptoms and relevant past medical, social and family history.  I completed an independent physical examination.  I was the initial provider who evaluated this patient. I have signed out the follow up of any pending tests (i.e. labs, radiological studies) to the ACP.  I have communicated the patient’s plan of care and disposition with the ACP.

## 2024-10-20 NOTE — ED PROVIDER NOTE - CLINICAL SUMMARY MEDICAL DECISION MAKING FREE TEXT BOX
On arrival noted to be tachycardic 150s, hypertensive 199 systolic. Moved to  for eval. On assessment HR 110s, /122. Otherwise HD stable spo2 99% on RA. EKG without ischemic changes. Will obtain blood work including trop level. Given 1mg ativan for anxiety and 10 amlodpine for BP control.

## 2024-10-20 NOTE — ED ADULT NURSE REASSESSMENT NOTE - NS ED NURSE REASSESS COMMENT FT1
Assumed care of patient at 17:35 from VICKY Jones. Charting as noted. Patient AA&Ox4, resp even/unlabored, presents to ED with chest pain and elevated BP. At time of assessment, patient denies pain/discomfort, denies CP/SOB. Patient updated on the plan of care, awaiting nuclear stress test and TTE results. Stretcher locked in lowest position, IV site flushed w/ NS. No redness, swelling or pain noted to site. No signs of acute distress noted, safety maintained. Pt remains on CM in NSR, rate 74, SpO2 99% on room air. Assumed care of patient at 17:35 from VICKY Jones. Charting as noted. Patient AA&Ox4, resp even/unlabored, presents to ED with chest pain and elevated BP. At time of assessment, patient denies pain/discomfort, denies CP/SOB. Patient updated on the plan of care, awaiting nuclear stress test, CT, and TTE results. Stretcher locked in lowest position, IV site flushed w/ NS. No redness, swelling or pain noted to site. No signs of acute distress noted, safety maintained. Pt remains on CM in NSR, rate 74, SpO2 99% on room air.

## 2024-10-20 NOTE — ED ADULT NURSE REASSESSMENT NOTE - NS ED NURSE REASSESS COMMENT FT1
Assumed care of patient at 1300. Patient connected to cardiac monitor and pulse ox. Denies any current symptoms. Breathing is spontaneous even and unlabored. Bed is in lowest position and side rails up. Safety precautions maintained.

## 2024-10-20 NOTE — ED PROVIDER NOTE - ATTENDING CONTRIBUTION TO CARE
Pt states that last night she started with an indigestion sensation in her chest which progressed to cp today.  pt states that she has been very anxious as well. Pt went to work today and felt worse and came to the ED.    physical - tachy regular. ctab. abd - soft, nt. no edema. no rash.    plan - labs and imaging reviewed. EKG nonischemic.  case d/w cards attending, dr. roldan, who recommended obs for tte, c-score and NST tomorrow. case d/w Salvador Fagan and Dr. Ponce.

## 2024-10-20 NOTE — CONSULT NOTE ADULT - SUBJECTIVE AND OBJECTIVE BOX
[facility]                                              CARDIOLOGY AT [address], [place] [address]                                             Telephone: [phone]. Fax:[phone]                                                       CARDIOLOGY CONSULTATION NOTE                                                                                             History obtained by: Patient and medical record   Community Cardiologist: None    obtained: Yes [  ] No [  ]  Reason for Consultation: chest pain   Available out pt records reviewed: Yes [  ] No [  ]    Chief complaint:    Patient is a 54y old  Female who presents with a chief complaint of chest pain     HPI:  This is a 54 year old female w/PMHx anxiety, HTN, CAD, NSTEMI s/p [facility] june 2022 presents to the ED with chest pain. Patient states that it started yesterday while getting ready for work. Pain persisted all night followed by SOB 2 hours ago. Reports she used to take amlodipine 10mg however 2 months ago ran out of her meds. Denies any headache, vision changes, numbness, tingling, weakness, swelling. No recent travel. No fevers, chills, nausea, vomiting.      PAST MEDICAL HISTORY  Hypertension    Depressed    CAD (coronary artery disease)        PAST SURGICAL HISTORY  S/P tubal ligation    S/P hysterectomy    Vaginal cyst    S/P foot surgery        SUBSTANCE USE HISTORY  Denies current and previous substance use [  ]   CIGARETTES -   ALCOHOL -   DRUGS -     FAMILY HISTORY:  Family history of premature CAD (Father)        CARDIAC SPECIFIC FAMILY HX   No KNOWN family history of Cardiovascular disease, CAD, or sudden death in first degree relatives unless specified below  Family History of Cardiovascular Disease:  [  ]   Coronary Artery Disease in first degree relative:  [  ]   Sudden Cardiac Death in First degree relative: [  ]    HOME MEDICATIONS:  amLODIPine 10 mg oral tablet: 1 tab(s) orally once a day (04 Dec 2022 12:07)      CURRENT CARDIAC MEDICATIONS:      CURRENT OTHER MEDICATIONS:      ALLERGIES:   No Known Allergies      VITAL SIGNS:  T(C): 37.2 (10-20-24 @ 12:09), Max: 37.2 (10-20-24 @ 12:09)  T(F): 98.9 (10-20-24 @ 12:09), Max: 98.9 (10-20-24 @ 12:09)  HR: 73 (10-20-24 @ 15:46) (73 - 149)  BP: 166/98 (10-20-24 @ 15:46) (157/101 - 214/122)  RR: 18 (10-20-24 @ 12:50) (18 - 18)  SpO2: 100% (10-20-24 @ 12:50) (100% - 100%)    INTAKE AND OUTPUT:       LABS:                            13.3   9.12  )-----------( 207      ( 20 Oct 2024 12:22 )             40.5     10-20    139  |  101  |  12.8  ----------------------------<  117[H]  3.2[L]   |  21.0[L]  |  0.72    Ca    8.6      20 Oct 2024 12:22    TPro  7.6  /  Alb  4.4  /  TBili  1.1  /  DBili  x   /  AST  31  /  ALT  15  /  AlkPhos  100  10-20    PT/INR - ( 20 Oct 2024 12:22 )   PT: 10.2 sec;   INR: 0.90 ratio         PTT - ( 20 Oct 2024 12:22 )  PTT:27.1 sec  Urinalysis Basic - ( 20 Oct 2024 12:22 )    Color: x / Appearance: x / SG: x / pH: x  Gluc: 117 mg/dL / Ketone: x  / Bili: x / Urobili: x   Blood: x / Protein: x / Nitrite: x   Leuk Esterase: x / RBC: x / WBC x   Sq Epi: x / Non Sq Epi: x / Bacteria: x      RADIOLOGY IMAGING:   CT Heart Calcium Score: Urgent   Indication: chest pain  Transport: Stretcher-Crib  Provider's Contact #: [phone] (10-20-24 @ 14:55) [Ordered.]  Xray Chest 1 View- PORTABLE-Urgent: Urgent   Indication: Chest Pain  Transport: Portable  Exam Completed (10-20-24 @ 12:25) [Results Available]  12 Lead ECG (10-20-24 @ 12:25) [Discontinued]  12 Lead ECG:   Provider's Contact #: [phone] (10-20-24 @ 12:12) [Completed]

## 2024-10-20 NOTE — CONSULT NOTE ADULT - NS ATTEND AMEND GEN_ALL_CORE FT
Patient seen and examined by me.    T(C): 37.2 (10-20-24 @ 12:09), Max: 37.2 (10-20-24 @ 12:09)  HR: 73 (10-20-24 @ 15:46) (73 - 149)  BP: 166/98 (10-20-24 @ 15:46) (157/101 - 214/122)  RR: 18 (10-20-24 @ 12:50) (18 - 18)  SpO2: 100% (10-20-24 @ 12:50) (100% - 100%)  Patient alert and awake.  Chest- Bilateral Clear BS  Cardiac- S1 and S2  Abdomen- Soft    Assessment/Plan:  1. Chest pain  2. Preop    For echo and exercise nuc    I have discussed my recommendation with the PA which are outlined above.  Will follow. Patient seen and examined by me.    T(C): 37.2 (10--24 @ 12:09), Max: 37.2 (10-20-24 @ 12:09)  HR: 73 (10-20-24 @ 15:46) (73 - 149)  BP: 166/98 (10-20-24 @ 15:46) (157/101 - 214/122)  RR: 18 (10--24 @ 12:50) (18 - 18)  SpO2: 100% (10--24 @ 12:50) (100% - 100%)  Patient alert and awake.  Chest- Bilateral Clear BS  Cardiac- S1 and S2  Abdomen- Soft    Assessment/Plan:  1. Chest pain  2. ETOH abuse- Drinks 1/2 pint of dagoberto daily  3. Strong family history of premature CAD- 2 siblings  of MI    For echo and exercise nuc    I have discussed my recommendation with the PA which are outlined above.  Will follow. Patient seen and examined by me.    T(C): 37.2 (10-20-24 @ 12:09), Max: 37.2 (10--24 @ 12:09)  HR: 73 (10--24 @ 15:46) (73 - 149)  BP: 166/98 (10-20-24 @ 15:46) (157/101 - 214/122)  RR: 18 (10-20-24 @ 12:50) (18 - 18)  SpO2: 100% (10-20-24 @ 12:50) (100% - 100%)  Patient alert and awake.  Chest- Bilateral Clear BS  Cardiac- S1 and S2  Abdomen- Soft    Assessment/Plan:  1. Chest pain- [facility] in - Prox LAD 50%  2. ETOH abuse- Drinks 1/2 pint of dagoberto daily  3. Strong family history of premature CAD- 2 siblings  of MI    For echo and exercise nuc    I have discussed my recommendation with the PA which are outlined above.  Will follow.

## 2024-10-20 NOTE — ED ADULT NURSE REASSESSMENT NOTE - NS ED NURSE REASSESS COMMENT FT1
assumed care of pt from VICKY Hahn. pt resting comfortably in stretcher in NAD, resps even and unlabored. denies any pain/discomfort at the moment. pt ambulatory to bathroom without incident. awaiting CT and NST.

## 2024-10-20 NOTE — ED ADULT TRIAGE NOTE - CHIEF COMPLAINT QUOTE
pt c/o chest pain to left chest, + SOB, feels " like she is suffocating"  A&Ox3, resp wnl, had a MI 4.5 years ago

## 2024-10-20 NOTE — ED ADULT NURSE NOTE - OBJECTIVE STATEMENT
Assumed care of patient in critical care in C-6 s/p being tachycardic and hypertensive in triage. Pt a&Ox4 rr even and unlabored reports pmhx of "heart attack" approximately 4-5 years ago, htn presents to ed with c/o of pressure over chest since yesterday. Reports being placed on amlodipine 10mg after her "heart attack" denies any hx of stents/bypass grafts. Reports not taking medications for "a couple of months". Denies cough, blood in sputum, abdominal pain, n/v/c/d, urinary symptoms. pt educated on plan of care, pt able to successfully teach back plan of care to RN, RN will continue to reeducate pt during hospital stay.

## 2024-10-20 NOTE — ED CDU PROVIDER INITIAL DAY NOTE - OBJECTIVE STATEMENT
This is a 54 year old female with pmhx of HTN non compliant with Amlodopine 10mg here for indigestion type bubbles to chest wall.  While in ED patient found to be hypertensive.  She notes h/o smoking 4 cigarettes daily > 20 years.  Patient has family history of HTN/DM.

## 2024-10-20 NOTE — CONSULT NOTE ADULT - PROBLEM SELECTOR RECOMMENDATION 9
- pt is active smoker and drinks 1/2 pint of dagoberto every day   - c/o of chest pain at rest which was persistent and progressive over 3 day period   - Pt has hypertensive emergency in setting of SBP >180, chest pain largely alleviated after antihypertensives   - pt was supposed to take amlodipine 10mg daily but ran out of meds 2 months ago and did not get a refill   - trop is negative  - EKG non ischemic w/ LVH   - pt has family history of   - will get echocardiogram and CT calcium score today  - will plan for nuclear stress test in AM   - Keep in observation  - keep NPO @ [place]  - screen for HLD, and DM

## 2024-10-20 NOTE — ED ADULT NURSE NOTE - NSSEPSISSUSPECTED_ED_A_ED
Assessments: A&Ox4. Generalized weakness.Intermitt pain RUQ, No nausea/vomiting. Does have chronic dizzyness per family and patient Up SBA with walker. Had BMs.     Major Shift Events: Care conference    Treatment Plan: restorative cares, dronabinol daily    Bedside Nurse: Gavin Clark RN      No

## 2024-10-20 NOTE — ED PROVIDER NOTE - OBJECTIVE STATEMENT
54 year old female w/PMHx anxiety, HTN, CAD, NSTEMI s/p [facility] june 2022 presents to the ED with chest pain. Patient states that it started yesterday while getting ready for work. Pain persisted all night followed by SOB 2 hours ago. Reports she used to take amlodipine 10mg however 2 months ago ran out of her meds. Denies any headache, vision changes, numbness, tingling, weakness, swelling. No recent travel. No fevers, chills, nausea, vomiting.

## 2024-10-21 ENCOUNTER — RESULT REVIEW (OUTPATIENT)
Age: 54
End: 2024-10-21

## 2024-10-21 VITALS
TEMPERATURE: 98 F | OXYGEN SATURATION: 98 % | SYSTOLIC BLOOD PRESSURE: 135 MMHG | DIASTOLIC BLOOD PRESSURE: 76 MMHG | HEART RATE: 87 BPM | RESPIRATION RATE: 16 BRPM

## 2024-10-21 DIAGNOSIS — I10 ESSENTIAL (PRIMARY) HYPERTENSION: ICD-10-CM

## 2024-10-21 PROCEDURE — 93306 TTE W/DOPPLER COMPLETE: CPT

## 2024-10-21 PROCEDURE — 93017 CV STRESS TEST TRACING ONLY: CPT

## 2024-10-21 PROCEDURE — A9500: CPT

## 2024-10-21 PROCEDURE — 99239 HOSP IP/OBS DSCHRG MGMT >30: CPT

## 2024-10-21 PROCEDURE — 99285 EMERGENCY DEPT VISIT HI MDM: CPT | Mod: 25

## 2024-10-21 PROCEDURE — 93016 CV STRESS TEST SUPVJ ONLY: CPT | Mod: MC

## 2024-10-21 PROCEDURE — 78452 HT MUSCLE IMAGE SPECT MULT: CPT | Mod: MC

## 2024-10-21 PROCEDURE — 80053 COMPREHEN METABOLIC PANEL: CPT

## 2024-10-21 PROCEDURE — 85025 COMPLETE CBC W/AUTO DIFF WBC: CPT

## 2024-10-21 PROCEDURE — 84484 ASSAY OF TROPONIN QUANT: CPT

## 2024-10-21 PROCEDURE — 93005 ELECTROCARDIOGRAM TRACING: CPT

## 2024-10-21 PROCEDURE — 96374 THER/PROPH/DIAG INJ IV PUSH: CPT | Mod: XU

## 2024-10-21 PROCEDURE — G0378: CPT

## 2024-10-21 PROCEDURE — 78452 HT MUSCLE IMAGE SPECT MULT: CPT | Mod: 26,MC

## 2024-10-21 PROCEDURE — 71045 X-RAY EXAM CHEST 1 VIEW: CPT

## 2024-10-21 PROCEDURE — 75571 CT HRT W/O DYE W/CA TEST: CPT | Mod: MC

## 2024-10-21 PROCEDURE — 75571 CT HRT W/O DYE W/CA TEST: CPT | Mod: 26,MC

## 2024-10-21 PROCEDURE — 85730 THROMBOPLASTIN TIME PARTIAL: CPT

## 2024-10-21 PROCEDURE — 99284 EMERGENCY DEPT VISIT MOD MDM: CPT

## 2024-10-21 PROCEDURE — 85610 PROTHROMBIN TIME: CPT

## 2024-10-21 PROCEDURE — 93018 CV STRESS TEST I&R ONLY: CPT | Mod: MC

## 2024-10-21 PROCEDURE — 36415 COLL VENOUS BLD VENIPUNCTURE: CPT

## 2024-10-21 RX ADMIN — Medication 1 PATCH: at 05:55

## 2024-10-21 RX ADMIN — Medication 1 PATCH: at 07:22

## 2024-10-21 RX ADMIN — Medication 10 MILLIGRAM(S): at 05:42

## 2024-10-21 NOTE — PROGRESS NOTE ADULT - NS ATTEND AMEND GEN_ALL_CORE FT
Patient was seen and examined at bedside; consulted for chest pain and referred for NST and CT calcium score Patient was seen and examined at bedside; consulted for chest pain and referred for NST and CT calcium score  CT calcium score of 0   TTE:  Left ventricular cavity is normal in size. Left ventricular wall thickness is normal. Left ventricular systolic function is hyperdynamic with an ejection fraction visually estimated at >75 %.    54 year old female w/PMHx anxiety, HTN, CAD, NSTEMI s/p [facility] june 2022 presents to the ED with chest pain. Patient states that it started yesterday while getting ready for work. Pain persisted all night followed by SOB 2 hours ago. Reports she used to take amlodipine 10mg however 2 months ago ran out of her meds.     1. Chest pain, possibly non cardiac     - patient had CT calcium score of 0 with normal TTE showing no evidence of wall motion abnormalities and pending the results of the stress test   - blood pressure controlled on NOrvasc 10mg po qdaily   - obtain Lipid Panel / Lipoprotein A     if NST is normal no further cardiovascular work up     Aydee Jara D.O. [facility]  Cardiology/Vascular Cardiology -[facility]   Telephone # [phone]

## 2024-10-21 NOTE — PROGRESS NOTE ADULT - NS ATTEND OPT1 GEN_ALL_CORE
97
I attest my time as attending is greater than 50% of the total combined time spent on qualifying patient care activities by the PA/NP and attending.

## 2024-10-21 NOTE — PROGRESS NOTE ADULT - ASSESSMENT
54 year old female w/PMHx anxiety, HTN, CAD, NSTEMI s/p [facility] june 2022 presents to the ED with chest pain. Patient states that it started yesterday while getting ready for work. Pain persisted all night followed by SOB 2 hours ago. Reports she used to take amlodipine 10mg however 2 months ago ran out of her meds.

## 2024-10-21 NOTE — ED ADULT NURSE REASSESSMENT NOTE - NS ED NURSE REASSESS COMMENT FT1
Patient AA&Ox4, resp even/unlabored, ambulatory, steady gait noted, discharged home with all belongings. Med rec and discharge instructions explained. Patient verbalizes understanding on physician follow up, medication regimen and next dose, s/s of complications and monitoring. No distress noted. VSS, recorded in EMR.

## 2024-10-21 NOTE — ED CDU PROVIDER DISPOSITION NOTE - CARE PROVIDER_API CALL
Aydee Jara  Cardiology  [address] [place] [id number]  Phone: [phone]  Fax: [phone]  Follow Up Time:

## 2024-10-21 NOTE — PROGRESS NOTE ADULT - PROBLEM SELECTOR PLAN 2
.  - SBP >200 on admission  - BP stable now  - would continue amlodipine 10mg PO daily  - encouraged medication compliance.

## 2024-10-21 NOTE — ED CDU PROVIDER DISPOSITION NOTE - PATIENT PORTAL LINK FT
You can access the FollowMyHealth Patient Portal offered by [facility] by registering at the following website: [email or web address]. By joining [facility]’s FollowMyHealth portal, you will also be able to view your health information using other applications (apps) compatible with our system.

## 2024-10-21 NOTE — ED CDU PROVIDER SUBSEQUENT DAY NOTE - ATTENDING APP SHARED VISIT CONTRIBUTION OF CARE
No events overnight.  Well-appearing, no acute distress.  Pending final cardiac imaging and results of nuclear stress test, final cardiology recommendations pending.

## 2024-10-21 NOTE — ED ADULT NURSE REASSESSMENT NOTE - NS ED NURSE REASSESS COMMENT FT1
Patient a&ox3, no acute distress, resp nonlabored, resting comfortably in bed.  Denies headache, dizziness, chest pain, palpitations, SOB, abd pain, n/v/d, urinary symptoms, fevers, chills, weakness at this time. Patient awaiting CT w/ calcium score and nuclear stress test. Safety maintained with bed locked in lowest position.

## 2024-10-21 NOTE — ED ADULT NURSE REASSESSMENT NOTE - NS ED NURSE REASSESS COMMENT FT1
Assumed care of patient at 07:15 from RN JEANETH Virgen. Charting as noted. Patient AA&Ox4, resp even/unlabored, presents to ED with chest pain. At time of assessment, patient denies pain/discomfort, denies CP/SOB. Patient updated on the plan of care, awaiting CTA and NST. Stretcher locked in lowest position, IV site flushed w/ NS. No redness, swelling or pain noted to site. No signs of acute distress noted. Pt remains on CM in NSR, rate 67, SpO2 97% on room air.

## 2024-10-21 NOTE — ED CDU PROVIDER SUBSEQUENT DAY NOTE - CLINICAL SUMMARY MEDICAL DECISION MAKING FREE TEXT BOX
chest pain with risk factors  seen by [facility] cardiology  reccomends echo, calcium CT and NST 10/21  c/w cardiac monitor  serial trops/ekg  dash diet  activity ambulate as tolerated

## 2024-10-21 NOTE — PROGRESS NOTE ADULT - PROBLEM SELECTOR PLAN 1
.  - NST/Calcium score today   - strong family hx heart disease   - active smoker, active ETOH; discussed cessation of both  - further recs after diagnostics resulted

## 2024-10-21 NOTE — ED CDU PROVIDER DISPOSITION NOTE - ATTENDING CONTRIBUTION TO CARE
I agree with the PA's note and was available for any issues/concerns. I was directly involved in patient care. My brief overall assessment is as follows: 54 year old female w/PMHx anxiety, HTN, CAD, NSTEMI s/p [facility] june 2022 presents to the ED with chest pain.  ctca neg, neg stress test, seen by cards. cleared, return precautions.

## 2024-10-21 NOTE — PROGRESS NOTE ADULT - SUBJECTIVE AND OBJECTIVE BOX
[facility]                                                         CARDIOLOGY AT [facility]                                                                  [address] [address]                                                         Telephone: [phone]. Fax:[phone]                                                                             PROGRESS NOTE    Reason for follow up: chest pain  Last 24h Telemetry: SR  Overall Plan: NST, calcium score today      Review of symptoms:   Cardiac:  + chest pain. No dyspnea. No palpitations.  Respiratory: no cough. No dyspnea  Gastrointestinal: No diarrhea. No abdominal pain. No bleeding.   Neuro: No focal neuro complaints.      Vitals:  T(C): 37.3 (10-21-24 @ 05:40), Max: 37.3 (10-21-24 @ 05:40)  HR: 67 (10-21-24 @ 05:40) (67 - 149)  BP: 134/81 (10-21-24 @ 05:40) (124/70 - 214/122)  RR: 16 (10-21-24 @ 05:40) (16 - 18)  SpO2: 97% (10-21-24 @ 05:40) (97% - 100%)      I&O's Summary      Weight (kg): 55.5 (10-20 @ 12:09)      PHYSICAL EXAM:  Appearance: Comfortable. No acute distress  HEENT:  Atraumatic. Normocephalic. Normal oral mucosa  Neurologic: A & O x 3, no gross focal deficits.  Cardiovascular: RRR S1 S2, No murmur, no rubs/gallops. No JVD  Respiratory: Lungs clear to auscultation, unlabored   Gastrointestinal:  Soft, Non-tender, + BS  Lower Extremities: No edema  Psychiatry: Patient is calm. No agitation.   Skin: warm and dry.      CURRENT CARDIAC MEDICATIONS:  amLODIPine Tablet 10 milliGRAM(s) Oral daily      CURRENT OTHER MEDICATIONS:      LABS:	 	                    13.3   9.12  )-----------( 207      ( 20 Oct 2024 12:22 )             40.5     10-20    139  |  101  |  12.8  ----------------------------<  117[H]  3.2[L]   |  21.0[L]  |  0.72    Ca    8.6      20 Oct 2024 12:22    TPro  7.6  /  Alb  4.4  /  TBili  1.1  /  DBili  x   /  AST  31  /  ALT  15  /  AlkPhos  100  10-20    PT/INR/PTT ( 20 Oct 2024 12:22 )                       :                       :      10.2         :       27.1                  .        .                   .              .           .       0.90        .                                         TELEMETRY: SR rate 63, no alarms      DIAGNOSTIC TESTING:  [ ] Echocardiogram:   < from: TTE W or WO Ultrasound Enhancing Agent (10.20.24 @ 15:20) >  _______________________________________________________________________________________     CONCLUSIONS:      1. Technically difficult image quality.   2. Left ventricular cavity is normal in size. Left ventricular wall thickness is normal. Left ventricular systolic function is hyperdynamic with an ejection fraction visually estimated at >75 %.   3. The right ventricle is not well visualized. Probably normal right ventricular cavity size and normal right ventricular systolic function.   4. Interatrial septum is aneurysmal. Cannot rule out trivial PFO.   5. No significant valvular disease.   6. Trace pericardial effusion noted adjacent to the right ventricle.   7. No prior echocardiogram is available for comparison.    ________________________________________________________________________________________    < end of copied text >    < from: Cardiac Cath Lab - Adult (06.08.20 @ 08:16) >   Angiographic Findings     Cardiac Arteries and Lesion Findings    LMCA: Normal.    LAD: Abnormal.There is a 50% stenosis noted in the ostial portion of the vessel.         Prox LAD: Ostial.50% stenosis.    LCx: Normal.    RCA: Normal.    < end of copied text >

## 2024-10-21 NOTE — ED CDU PROVIDER DISPOSITION NOTE - CLINICAL COURSE
54 year old female w/PMHx anxiety, HTN, CAD, NSTEMI s/p [facility] june 2022 presents to the ED with chest pain. Patient states that it started yesterday while getting ready for work. Pain persisted all night followed by SOB 2 hours ago. Reports she used to take amlodipine 10mg however 2 months ago ran out of her meds. Labs reviewed.  Troponin negative x 2.  Patient with mild hypokalemia, given p.o. potassium.  CCTA reveals a calcium score of 0.  Chest x-ray reviewed negative for acute pathology.  TTE without any significant structural abnormalities.  NSVT within normal limits.  Discussed with Dr. Jara, no further cardiac workup necessary at this time.  Patient stable for discharge with outpatient cardiology follow-up, return precautions discussed.  All incidental findings discussed with patient during her stay.

## 2024-10-21 NOTE — ED ADULT NURSE REASSESSMENT NOTE - NS ED NURSE REASSESS COMMENT FT1
Patient resting comfortably in bed, awaiting CT. No signs of acute distress noted, spouse at bed side, safety maintained. CM remains in place, NSR, rate 77.

## 2024-11-25 ENCOUNTER — NON-APPOINTMENT (OUTPATIENT)
Age: 54
End: 2024-11-25

## 2024-11-25 ENCOUNTER — APPOINTMENT (OUTPATIENT)
Dept: CARDIOLOGY | Facility: CLINIC | Age: 54
End: 2024-11-25
Payer: COMMERCIAL

## 2024-11-25 VITALS
HEART RATE: 83 BPM | WEIGHT: 128 LBS | OXYGEN SATURATION: 99 % | DIASTOLIC BLOOD PRESSURE: 78 MMHG | BODY MASS INDEX: 20.09 KG/M2 | SYSTOLIC BLOOD PRESSURE: 124 MMHG | HEIGHT: 67 IN

## 2024-11-25 DIAGNOSIS — R14.3 FLATULENCE: ICD-10-CM

## 2024-11-25 DIAGNOSIS — K59.00 CONSTIPATION, UNSPECIFIED: ICD-10-CM

## 2024-11-25 DIAGNOSIS — I25.10 ATHEROSCLEROTIC HEART DISEASE OF NATIVE CORONARY ARTERY W/OUT ANGINA PECTORIS: ICD-10-CM

## 2024-11-25 DIAGNOSIS — Z80.9 FAMILY HISTORY OF MALIGNANT NEOPLASM, UNSPECIFIED: ICD-10-CM

## 2024-11-25 DIAGNOSIS — R20.2 ANESTHESIA OF SKIN: ICD-10-CM

## 2024-11-25 DIAGNOSIS — Z82.49 FAMILY HISTORY OF ISCHEMIC HEART DISEASE AND OTHER DISEASES OF THE CIRCULATORY SYSTEM: ICD-10-CM

## 2024-11-25 DIAGNOSIS — I25.2 OLD MYOCARDIAL INFARCTION: ICD-10-CM

## 2024-11-25 DIAGNOSIS — I10 ESSENTIAL (PRIMARY) HYPERTENSION: ICD-10-CM

## 2024-11-25 DIAGNOSIS — R20.0 ANESTHESIA OF SKIN: ICD-10-CM

## 2024-11-25 DIAGNOSIS — E87.6 HYPOKALEMIA: ICD-10-CM

## 2024-11-25 PROCEDURE — 99214 OFFICE O/P EST MOD 30 MIN: CPT | Mod: 25

## 2024-11-25 PROCEDURE — 93000 ELECTROCARDIOGRAM COMPLETE: CPT

## 2024-11-25 RX ORDER — ROSUVASTATIN CALCIUM 5 MG/1
5 TABLET, FILM COATED ORAL
Qty: 90 | Refills: 1 | Status: ACTIVE | COMMUNITY
Start: 2024-11-25 | End: 1900-01-01

## 2025-05-27 ENCOUNTER — NON-APPOINTMENT (OUTPATIENT)
Age: 55
End: 2025-05-27

## 2025-05-29 ENCOUNTER — APPOINTMENT (OUTPATIENT)
Dept: CARDIOLOGY | Facility: CLINIC | Age: 55
End: 2025-05-29

## 2025-05-29 VITALS
OXYGEN SATURATION: 99 % | BODY MASS INDEX: 21.97 KG/M2 | WEIGHT: 140 LBS | HEART RATE: 110 BPM | HEIGHT: 67 IN | DIASTOLIC BLOOD PRESSURE: 69 MMHG | SYSTOLIC BLOOD PRESSURE: 112 MMHG

## 2025-05-29 PROCEDURE — 99214 OFFICE O/P EST MOD 30 MIN: CPT

## 2025-05-29 PROCEDURE — 93000 ELECTROCARDIOGRAM COMPLETE: CPT

## 2025-05-29 PROCEDURE — G2211 COMPLEX E/M VISIT ADD ON: CPT | Mod: NC
